# Patient Record
Sex: FEMALE | Race: WHITE | Employment: PART TIME | URBAN - METROPOLITAN AREA
[De-identification: names, ages, dates, MRNs, and addresses within clinical notes are randomized per-mention and may not be internally consistent; named-entity substitution may affect disease eponyms.]

---

## 2019-02-25 ENCOUNTER — HOSPITAL ENCOUNTER (OUTPATIENT)
Dept: MAMMOGRAPHY | Age: 49
Discharge: HOME | End: 2019-02-27
Payer: COMMERCIAL

## 2019-02-25 DIAGNOSIS — Z12.31 VISIT FOR SCREENING MAMMOGRAM: ICD-10-CM

## 2019-02-25 PROCEDURE — 77063 BREAST TOMOSYNTHESIS BI: CPT

## 2020-02-19 ENCOUNTER — OFFICE VISIT (OUTPATIENT)
Dept: PRIMARY CARE CLINIC | Age: 50
End: 2020-02-19
Payer: COMMERCIAL

## 2020-02-19 ENCOUNTER — HOSPITAL ENCOUNTER (OUTPATIENT)
Age: 50
Discharge: HOME | End: 2020-02-21
Payer: COMMERCIAL

## 2020-02-19 VITALS
OXYGEN SATURATION: 98 % | WEIGHT: 230 LBS | HEIGHT: 67 IN | DIASTOLIC BLOOD PRESSURE: 80 MMHG | HEART RATE: 81 BPM | SYSTOLIC BLOOD PRESSURE: 138 MMHG | BODY MASS INDEX: 36.1 KG/M2 | TEMPERATURE: 97.8 F

## 2020-02-19 PROBLEM — D68.59 THROMBOPHILIA (HCC): Status: ACTIVE | Noted: 2020-02-19

## 2020-02-19 PROBLEM — E78.2 MIXED HYPERLIPIDEMIA: Status: ACTIVE | Noted: 2020-02-19

## 2020-02-19 PROBLEM — G47.33 OSA (OBSTRUCTIVE SLEEP APNEA): Status: ACTIVE | Noted: 2020-02-19

## 2020-02-19 PROBLEM — Z00.00 HEALTH MAINTENANCE EXAMINATION: Status: ACTIVE | Noted: 2020-02-19

## 2020-02-19 PROBLEM — K76.9 LIVER DISEASE: Status: ACTIVE | Noted: 2020-02-19

## 2020-02-19 PROBLEM — E11.65 TYPE 2 DIABETES MELLITUS WITH HYPERGLYCEMIA, WITHOUT LONG-TERM CURRENT USE OF INSULIN (HCC): Status: ACTIVE | Noted: 2020-02-19

## 2020-02-19 LAB
ALBUMIN SERPL-MCNC: 4.4 G/DL (ref 3.5–5.2)
ALP BLD-CCNC: 74 U/L (ref 35–104)
ALT SERPL-CCNC: 26 U/L (ref 0–32)
ANION GAP SERPL CALCULATED.3IONS-SCNC: 16 MMOL/L (ref 7–16)
AST SERPL-CCNC: 19 U/L (ref 0–31)
BACTERIA: ABNORMAL /HPF
BASOPHILS ABSOLUTE: 0.05 E9/L (ref 0–0.2)
BASOPHILS RELATIVE PERCENT: 0.6 % (ref 0–2)
BILIRUB SERPL-MCNC: 0.3 MG/DL (ref 0–1.2)
BILIRUBIN URINE: NEGATIVE
BLOOD, URINE: NORMAL
BUN BLDV-MCNC: 13 MG/DL (ref 6–20)
CALCIUM SERPL-MCNC: 9.6 MG/DL (ref 8.6–10.2)
CHLORIDE BLD-SCNC: 98 MMOL/L (ref 98–107)
CHOLESTEROL, TOTAL: 156 MG/DL (ref 0–199)
CLARITY: CLEAR
CO2: 22 MMOL/L (ref 22–29)
COLOR: YELLOW
CREAT SERPL-MCNC: 0.7 MG/DL (ref 0.5–1)
CREATININE URINE: 95 MG/DL (ref 29–226)
EOSINOPHILS ABSOLUTE: 0.17 E9/L (ref 0.05–0.5)
EOSINOPHILS RELATIVE PERCENT: 2 % (ref 0–6)
GFR AFRICAN AMERICAN: >60
GFR NON-AFRICAN AMERICAN: >60 ML/MIN/1.73
GLUCOSE BLD-MCNC: 217 MG/DL (ref 74–99)
GLUCOSE URINE: NEGATIVE MG/DL
HBA1C MFR BLD: 10.2 % (ref 4–5.6)
HCT VFR BLD CALC: 42.8 % (ref 34–48)
HDLC SERPL-MCNC: 50 MG/DL
HEMOGLOBIN: 13.7 G/DL (ref 11.5–15.5)
IMMATURE GRANULOCYTES #: 0.03 E9/L
IMMATURE GRANULOCYTES %: 0.3 % (ref 0–5)
KETONES, URINE: NEGATIVE MG/DL
LDL CHOLESTEROL CALCULATED: 71 MG/DL (ref 0–99)
LEUKOCYTE ESTERASE, URINE: NEGATIVE
LYMPHOCYTES ABSOLUTE: 2.2 E9/L (ref 1.5–4)
LYMPHOCYTES RELATIVE PERCENT: 25.6 % (ref 20–42)
MCH RBC QN AUTO: 28.5 PG (ref 26–35)
MCHC RBC AUTO-ENTMCNC: 32 % (ref 32–34.5)
MCV RBC AUTO: 89 FL (ref 80–99.9)
MICROALBUMIN UR-MCNC: <12 MG/L
MICROALBUMIN/CREAT UR-RTO: ABNORMAL (ref 0–30)
MONOCYTES ABSOLUTE: 0.67 E9/L (ref 0.1–0.95)
MONOCYTES RELATIVE PERCENT: 7.8 % (ref 2–12)
NEUTROPHILS ABSOLUTE: 5.46 E9/L (ref 1.8–7.3)
NEUTROPHILS RELATIVE PERCENT: 63.7 % (ref 43–80)
NITRITE, URINE: NEGATIVE
PDW BLD-RTO: 13 FL (ref 11.5–15)
PH UA: 5 (ref 5–9)
PLATELET # BLD: 383 E9/L (ref 130–450)
PMV BLD AUTO: 9.9 FL (ref 7–12)
POTASSIUM SERPL-SCNC: 4.4 MMOL/L (ref 3.5–5)
PROTEIN UA: NEGATIVE MG/DL
RBC # BLD: 4.81 E12/L (ref 3.5–5.5)
RBC UA: ABNORMAL /HPF (ref 0–2)
SODIUM BLD-SCNC: 136 MMOL/L (ref 132–146)
SPECIFIC GRAVITY UA: >=1.03 (ref 1–1.03)
TOTAL PROTEIN: 7.8 G/DL (ref 6.4–8.3)
TRIGL SERPL-MCNC: 174 MG/DL (ref 0–149)
TSH SERPL DL<=0.05 MIU/L-ACNC: 2.21 UIU/ML (ref 0.27–4.2)
UROBILINOGEN, URINE: 0.2 E.U./DL
VLDLC SERPL CALC-MCNC: 35 MG/DL
WBC # BLD: 8.6 E9/L (ref 4.5–11.5)
WBC UA: ABNORMAL /HPF (ref 0–5)

## 2020-02-19 PROCEDURE — 80053 COMPREHEN METABOLIC PANEL: CPT

## 2020-02-19 PROCEDURE — 84443 ASSAY THYROID STIM HORMONE: CPT

## 2020-02-19 PROCEDURE — 82044 UR ALBUMIN SEMIQUANTITATIVE: CPT

## 2020-02-19 PROCEDURE — 81001 URINALYSIS AUTO W/SCOPE: CPT

## 2020-02-19 PROCEDURE — 99214 OFFICE O/P EST MOD 30 MIN: CPT | Performed by: FAMILY MEDICINE

## 2020-02-19 PROCEDURE — 85025 COMPLETE CBC W/AUTO DIFF WBC: CPT

## 2020-02-19 PROCEDURE — 80061 LIPID PANEL: CPT

## 2020-02-19 PROCEDURE — 83036 HEMOGLOBIN GLYCOSYLATED A1C: CPT

## 2020-02-19 PROCEDURE — 36415 COLL VENOUS BLD VENIPUNCTURE: CPT

## 2020-02-19 PROCEDURE — 82570 ASSAY OF URINE CREATININE: CPT

## 2020-02-19 RX ORDER — LISINOPRIL 40 MG/1
40 TABLET ORAL DAILY
COMMUNITY
Start: 2020-02-08 | End: 2020-02-19 | Stop reason: SDUPTHER

## 2020-02-19 RX ORDER — LISINOPRIL 40 MG/1
40 TABLET ORAL DAILY
Qty: 30 TABLET | Refills: 12 | Status: SHIPPED
Start: 2020-02-19 | End: 2021-03-15 | Stop reason: SDUPTHER

## 2020-02-19 RX ORDER — HYDROCHLOROTHIAZIDE 12.5 MG/1
12.5 TABLET ORAL DAILY
COMMUNITY
Start: 2020-02-08 | End: 2020-02-19 | Stop reason: SDUPTHER

## 2020-02-19 RX ORDER — AMLODIPINE BESYLATE 10 MG/1
10 TABLET ORAL DAILY
COMMUNITY
Start: 2020-01-24 | End: 2020-02-19 | Stop reason: SDUPTHER

## 2020-02-19 RX ORDER — SIMVASTATIN 20 MG
20 TABLET ORAL DAILY
Qty: 30 TABLET | Refills: 12 | Status: SHIPPED
Start: 2020-02-19 | End: 2021-03-22 | Stop reason: SDUPTHER

## 2020-02-19 RX ORDER — GLIPIZIDE 5 MG/1
5 TABLET, FILM COATED, EXTENDED RELEASE ORAL
COMMUNITY
Start: 2020-02-08 | End: 2020-02-19 | Stop reason: SDUPTHER

## 2020-02-19 RX ORDER — GLIPIZIDE 5 MG/1
TABLET, FILM COATED, EXTENDED RELEASE ORAL
Qty: 90 TABLET | Refills: 12 | Status: SHIPPED
Start: 2020-02-19 | End: 2021-03-15 | Stop reason: SDUPTHER

## 2020-02-19 RX ORDER — AMLODIPINE BESYLATE 10 MG/1
10 TABLET ORAL DAILY
Qty: 30 TABLET | Refills: 12 | Status: SHIPPED
Start: 2020-02-19 | End: 2021-03-22 | Stop reason: SDUPTHER

## 2020-02-19 RX ORDER — SIMVASTATIN 20 MG
20 TABLET ORAL DAILY
COMMUNITY
Start: 2020-01-24 | End: 2020-02-19 | Stop reason: SDUPTHER

## 2020-02-19 RX ORDER — HYDROCHLOROTHIAZIDE 12.5 MG/1
12.5 TABLET ORAL DAILY
Qty: 30 TABLET | Refills: 12 | Status: SHIPPED
Start: 2020-02-19 | End: 2021-03-15 | Stop reason: SDUPTHER

## 2020-02-19 RX ORDER — METOPROLOL SUCCINATE 50 MG/1
50 TABLET, EXTENDED RELEASE ORAL
COMMUNITY
Start: 2020-01-24 | End: 2020-02-19 | Stop reason: SDUPTHER

## 2020-02-19 RX ORDER — METOPROLOL SUCCINATE 50 MG/1
50 TABLET, EXTENDED RELEASE ORAL 2 TIMES DAILY
Qty: 60 TABLET | Refills: 12 | Status: SHIPPED
Start: 2020-02-19 | End: 2021-03-15 | Stop reason: SDUPTHER

## 2020-02-19 ASSESSMENT — PATIENT HEALTH QUESTIONNAIRE - PHQ9
2. FEELING DOWN, DEPRESSED OR HOPELESS: 0
1. LITTLE INTEREST OR PLEASURE IN DOING THINGS: 0
SUM OF ALL RESPONSES TO PHQ9 QUESTIONS 1 & 2: 0
SUM OF ALL RESPONSES TO PHQ QUESTIONS 1-9: 0
SUM OF ALL RESPONSES TO PHQ QUESTIONS 1-9: 0

## 2020-02-19 NOTE — ASSESSMENT & PLAN NOTE
Await bw. Tolerating therapy . declines seeing an endocrinologist and declines considering  injection/insulin. Lifestyle location emphasized. Hyper and hypoglycemic precautions reviewed. She's had some diabetic retinopathy needs to follow  closely with ophthamology. Watch ambulatory. If out of range, let us know. Stressed importance of daily foot examinations,  regular eye examinations etc. micro-and macrovascular complications reviewed . hyper and  hypoglycemic precautions reviewed at length. Janumet expensive .  consider jardiance, declines change now  Declines flu shot or pneumovax

## 2020-02-19 NOTE — ASSESSMENT & PLAN NOTE
Says excellent at home. Counseled. The risks of hypertension and hypotension reviewed. Watch closely ambulatory. Hyper and hypotensive precautions and parameters reviewed and written as well as parameters on pulse, call if out of range, ER dangers numbers. Lifestyle modification reviewed. Tolerating therapy.

## 2020-02-19 NOTE — PROGRESS NOTES
20  Deanna Manzo : 1970 Sex: female  Age: 45 y.o. Chief Complaint   Patient presents with    Medication Refill       HPI  HPI:    Patient has a follow-up. Last saw around . She feels well. Says her blood pressures been excellent at home. Says her sugars have overall been good. Has not had blood work in a while. She is compliant with her CPAP, working well she feels well with it. She uses it 7 days a week at least 6 hours per night        Declines flu shot or pneumovax. Review of Systems  ROS:  Const: Denies chills, fever, malaise and sweats. Eyes: Denies discharge, pain, redness and visual disturbance. ENMT: Denies earaches, other ear symptoms. Denies nasal or sinus symptoms other than stated  above. Denies mouth and tongue lesions and sore throat. CV: Denies chest discomfort, pain; diaphoresis, dizziness, edema, lightheadedness, orthopnea,  palpitations, syncope and near syncopal episode or any exertional symptoms  Resp: Denies cough, hemoptysis, pleuritic pain, SOB, sputum production and wheezing. GI: Denies abdominal pain, change in bowel habits, hematochezia, melena, nausea and vomiting. : Denies urinary symptoms including dysuria , urgency, frequency or hematuria. Musculo: Denies musculoskeletal symptoms. Skin: Denies bruising and rash.   Neuro: Denies headache, numbness, stiff neck, tingling and focal weakness slurred speech or facial  droop  Hema/Lymph: Denies bleeding/bruising tendency and enlarged lymph nodes        Current Outpatient Medications:     SITagliptin (JANUVIA) 100 MG tablet, Take 1 tablet by mouth daily, Disp: 30 tablet, Rfl: 12    simvastatin (ZOCOR) 20 MG tablet, Take 1 tablet by mouth daily, Disp: 30 tablet, Rfl: 12    metoprolol succinate (TOPROL XL) 50 MG extended release tablet, Take 1 tablet by mouth 2 times daily, Disp: 60 tablet, Rfl: 12    lisinopril (PRINIVIL;ZESTRIL) 40 MG tablet, Take 1 tablet by mouth daily, Disp: 30 tablet, Rfl: kg)        Physical Exam  Exam:  Const: Appears comfortable. No signs of acute distress present. Head/Face: Atraumatic on inspection. Eyes: EOMI in both eyes. No discharge from the eyes. PERRL. Sclerae clear. ENMT: Auditory canals normal. Tympanic membranes: intact and translucent. External nose WNL. Nasal mucosa is clear. Oropharynx: No erythema or exudate. Posterior pharynx is normal.  Neck: Supple. Palpation reveals no adenopathy. No masses appreciated. No JVD. Carotids: no  bruits. Resp: Respirations are unlabored. Clear to auscultation. No rales, rhonchi or wheezes appreciated  over the lungs bilaterally. CV: Rate is regular. Rhythm is regular. No gallop or rubs. No heart murmur appreciated. Extremities: No clubbing, cyanosis, or edema. No calf inflammation or tenderness. Abdomen: Bowel sounds are normoactive. Abdomen is soft, nontender, and nondistended. No  abdominal masses. No palpable hepatosplenomegaly. Lymph: No palpable or visible regional lymphadenopathy. Musculoskeletal: no acute joint inflammation. Skin: Dry and warm with no rash. Skin normal to inspection and palpation overall. Neuro: Alert and oriented. Affect: appropriate. Upper Extremities: 5/5 bilaterally. Lower Extremities:  5/5 bilaterally. Sensation intact to light touch. Reflexes: DTR's are symmetric and 2+ bilaterally. .  Cranial Nerves: Cranial nerves grossly intact. Assessment and Plan:   Diagnosis Orders   1. Type 2 diabetes mellitus with hyperglycemia, without long-term current use of insulin ([facility], [facility])  SITagliptin (JANUVIA) 100 MG tablet    glipiZIDE (GLUCOTROL XL) 5 MG extended release tablet    metFORMIN (GLUCOPHAGE) 1000 MG tablet    Comprehensive Metabolic Panel    Microalbumin / Creatinine Urine Ratio    TSH without Reflex    Urinalysis    Hemoglobin A1C   2. Thrombophilia (HCC)  CBC Auto Differential    TSH without Reflex   3.  Mixed hyperlipidemia  simvastatin (ZOCOR) 20 MG tablet    Comprehensive Metabolic Panel Lipid Panel    TSH without Reflex   4. CHINO (obstructive sleep apnea)  TSH without Reflex   5. Liver disease  Comprehensive Metabolic Panel    TSH without Reflex   6. Health maintenance examination  TSH without Reflex   7. Essential hypertension  metoprolol succinate (TOPROL XL) 50 MG extended release tablet    lisinopril (PRINIVIL;ZESTRIL) 40 MG tablet    hydrochlorothiazide (HYDRODIURIL) 12.5 MG tablet    amLODIPine (NORVASC) 10 MG tablet    TSH without Reflex       Essential hypertension  Says excellent at home. Counseled. The risks of hypertension and hypotension reviewed. Watch closely ambulatory. Hyper and hypotensive precautions and parameters reviewed and written as well as parameters on pulse, call if out of range, ER dangers numbers. Lifestyle modification reviewed. Tolerating therapy. Type 2 diabetes mellitus with hyperglycemia, without long-term current use of insulin (HCC)  Await bw. Tolerating therapy . declines seeing an endocrinologist and declines considering  injection/insulin. Lifestyle location emphasized. Hyper and hypoglycemic precautions reviewed. She's had some diabetic retinopathy needs to follow  closely with ophthamology. Watch ambulatory. If out of range, let us know. Stressed importance of daily foot examinations,  regular eye examinations etc. micro-and macrovascular complications reviewed . hyper and  hypoglycemic precautions reviewed at length. Janumet expensive . consider jardiance, declines change now  Declines flu shot or pneumovax    Thrombophilia ([facility] [address]  Counseled extensively. Differential reviewed, including serious etiologies. . Repeat. Mild, stable. Mixed hyperlipidemia  lifestyle modification reviewed. risk of hyperlipidemia reviewed tolerating therapy. Stable. Declines change in therapy. await repeat bw.    CHINO (obstructive sleep apnea)  Compliant. Feels very well with it, asymptomatic. Uses at least 6 to 8 hours per night 7 nights per week.     Liver disease  Counseled extensively. Differential reviewed, including serious etiologies. Likely fatty liver. Precautions reviewed. Lifestyle modification appropriate diet and weight loss reviewed. Risks of  even this leading to cirrhosis reviewed. Other than basic monitoring on interested in other evaluation  or treatment, in-depth blood work, imaging or otherwise. declines hep screening    Health maintenance examination  Counseled at length 2/19. Encourage yearly. Declines flu vaccine or Pneumovax. No flowsheet data found. Plan as above. Counseled extensively and differential diagnoses relevant to above were reviewed, including serious etiologies. Side effects and interactions of medications were reviewed. Compliance reviewed. Lifestyle modification. She will check insurance get blood work today and she can follow-up about a month to review and for full physical sooner as needed. Refills given. She really was hoping to follow-up yearly thereafter I explained the standards especially diabetes and ultimately she says she would agree to every 6 months but no more often unless issues. I Prefer every 3 to 4 months        As long as symptoms steadily improve/resolve, and medical conditions follow the expected course, FU as below, sooner PRN. Return in about 1 month (around 3/19/2020) for physical.         Signs and symptoms to watch for discussed, serious signs and symptoms reviewed. ER if any. Reyes Baker MD    Patients are advised to check with insurance company to ensure coverage and to fully understand benefits and cost prior to any testing. This note was created with the assistance of voice recognition software. Document was reviewed however may contain grammatical errors.

## 2020-02-19 NOTE — ASSESSMENT & PLAN NOTE
lifestyle modification reviewed. risk of hyperlipidemia reviewed tolerating therapy. Stable. Declines change in therapy. await repeat bw.

## 2020-02-24 RX ORDER — LANCETS 30 GAUGE
1 EACH MISCELLANEOUS DAILY
Qty: 100 EACH | Refills: 3 | Status: SHIPPED
Start: 2020-02-24 | End: 2020-03-04 | Stop reason: SDUPTHER

## 2020-02-24 RX ORDER — GLUCOSAMINE HCL/CHONDROITIN SU 500-400 MG
CAPSULE ORAL
Qty: 100 STRIP | Refills: 3 | Status: SHIPPED
Start: 2020-02-24 | End: 2020-02-26 | Stop reason: SDUPTHER

## 2020-02-24 RX ORDER — BLOOD-GLUCOSE METER
1 KIT MISCELLANEOUS DAILY
Qty: 1 KIT | Refills: 0 | Status: SHIPPED
Start: 2020-02-24 | End: 2020-02-26 | Stop reason: SDUPTHER

## 2020-02-26 RX ORDER — GLUCOSAMINE HCL/CHONDROITIN SU 500-400 MG
CAPSULE ORAL
Qty: 200 STRIP | Refills: 1 | Status: SHIPPED
Start: 2020-02-26 | End: 2020-03-04 | Stop reason: SDUPTHER

## 2020-02-26 RX ORDER — BLOOD-GLUCOSE METER
1 KIT MISCELLANEOUS DAILY
Qty: 1 KIT | Refills: 0 | Status: SHIPPED
Start: 2020-02-26 | End: 2020-03-04 | Stop reason: SDUPTHER

## 2020-02-26 NOTE — TELEPHONE ENCOUNTER
Pt calling asking for glucometer strips and new glucometer be sent to [facility]. Scripts were sent on 24th to [address] [place] [address] & [facility].   Scripts pended

## 2020-03-04 RX ORDER — LANCETS 30 GAUGE
1 EACH MISCELLANEOUS 2 TIMES DAILY
Qty: 200 EACH | Refills: 3 | Status: SHIPPED | OUTPATIENT
Start: 2020-03-04

## 2020-03-04 RX ORDER — GLUCOSAMINE HCL/CHONDROITIN SU 500-400 MG
CAPSULE ORAL
Qty: 200 STRIP | Refills: 1 | Status: SHIPPED | OUTPATIENT
Start: 2020-03-04

## 2020-03-04 RX ORDER — BLOOD-GLUCOSE METER
1 KIT MISCELLANEOUS DAILY
Qty: 1 KIT | Refills: 0 | Status: SHIPPED | OUTPATIENT
Start: 2020-03-04

## 2020-03-04 NOTE — TELEPHONE ENCOUNTER
Pt needs scripts for Glucometer and supplies resent to [facility]. Insurance will only cover One Touch. Med pended.   Noted in comments to only fill One Touch Verio

## 2020-03-20 PROBLEM — Z00.00 HEALTH MAINTENANCE EXAMINATION: Status: RESOLVED | Noted: 2020-02-19 | Resolved: 2020-03-20

## 2020-07-16 ENCOUNTER — TELEPHONE (OUTPATIENT)
Dept: PRIMARY CARE CLINIC | Age: 50
End: 2020-07-16

## 2020-07-28 NOTE — TELEPHONE ENCOUNTER
Pharmacy called into office stating there is no generic for Januvia. Pharmacy stated the script is still showing it needs a prior authorization. Please call [phone].

## 2020-08-04 NOTE — TELEPHONE ENCOUNTER
Spoke with russ at [facility]   Medication is approved for one year 08/04/2020-08/04/21  Will fax approval

## 2020-08-10 NOTE — TELEPHONE ENCOUNTER
Pharmacy calling this med is still running as denied. On the letter from the insurance it states the auth was only good for one day. It was ran on 8/4 and was also not going through then.

## 2021-03-15 DIAGNOSIS — I10 ESSENTIAL HYPERTENSION: ICD-10-CM

## 2021-03-15 DIAGNOSIS — E11.65 TYPE 2 DIABETES MELLITUS WITH HYPERGLYCEMIA, WITHOUT LONG-TERM CURRENT USE OF INSULIN (HCC): ICD-10-CM

## 2021-03-15 RX ORDER — LISINOPRIL 40 MG/1
40 TABLET ORAL DAILY
Qty: 30 TABLET | Refills: 0 | Status: SHIPPED
Start: 2021-03-15 | End: 2021-04-02 | Stop reason: SDUPTHER

## 2021-03-15 RX ORDER — HYDROCHLOROTHIAZIDE 12.5 MG/1
12.5 TABLET ORAL DAILY
Qty: 30 TABLET | Refills: 0 | Status: SHIPPED
Start: 2021-03-15 | End: 2021-04-02 | Stop reason: SDUPTHER

## 2021-03-15 RX ORDER — METOPROLOL SUCCINATE 50 MG/1
50 TABLET, EXTENDED RELEASE ORAL 2 TIMES DAILY
Qty: 60 TABLET | Refills: 0 | Status: SHIPPED
Start: 2021-03-15 | End: 2021-04-02 | Stop reason: SDUPTHER

## 2021-03-15 RX ORDER — GLIPIZIDE 5 MG/1
TABLET, FILM COATED, EXTENDED RELEASE ORAL
Qty: 90 TABLET | Refills: 0 | Status: SHIPPED
Start: 2021-03-15 | End: 2021-04-02 | Stop reason: SDUPTHER

## 2021-03-22 DIAGNOSIS — I10 ESSENTIAL HYPERTENSION: ICD-10-CM

## 2021-03-22 DIAGNOSIS — E78.2 MIXED HYPERLIPIDEMIA: ICD-10-CM

## 2021-03-22 DIAGNOSIS — E11.65 TYPE 2 DIABETES MELLITUS WITH HYPERGLYCEMIA, WITHOUT LONG-TERM CURRENT USE OF INSULIN (HCC): ICD-10-CM

## 2021-03-22 RX ORDER — SIMVASTATIN 20 MG
20 TABLET ORAL DAILY
Qty: 30 TABLET | Refills: 0 | Status: SHIPPED
Start: 2021-03-22 | End: 2021-04-02 | Stop reason: SDUPTHER

## 2021-03-22 RX ORDER — AMLODIPINE BESYLATE 10 MG/1
10 TABLET ORAL DAILY
Qty: 30 TABLET | Refills: 0 | Status: SHIPPED
Start: 2021-03-22 | End: 2021-04-02 | Stop reason: SDUPTHER

## 2021-04-02 ENCOUNTER — OFFICE VISIT (OUTPATIENT)
Dept: PRIMARY CARE CLINIC | Age: 51
End: 2021-04-02
Payer: COMMERCIAL

## 2021-04-02 VITALS
HEART RATE: 83 BPM | OXYGEN SATURATION: 97 % | SYSTOLIC BLOOD PRESSURE: 138 MMHG | TEMPERATURE: 97.3 F | WEIGHT: 206 LBS | DIASTOLIC BLOOD PRESSURE: 88 MMHG | BODY MASS INDEX: 32.26 KG/M2

## 2021-04-02 DIAGNOSIS — G47.33 OSA (OBSTRUCTIVE SLEEP APNEA): ICD-10-CM

## 2021-04-02 DIAGNOSIS — D68.59 THROMBOPHILIA (HCC): ICD-10-CM

## 2021-04-02 DIAGNOSIS — Z12.31 ENCOUNTER FOR SCREENING MAMMOGRAM FOR MALIGNANT NEOPLASM OF BREAST: ICD-10-CM

## 2021-04-02 DIAGNOSIS — Z12.11 COLON CANCER SCREENING: ICD-10-CM

## 2021-04-02 DIAGNOSIS — E78.2 MIXED HYPERLIPIDEMIA: ICD-10-CM

## 2021-04-02 DIAGNOSIS — I10 ESSENTIAL HYPERTENSION: ICD-10-CM

## 2021-04-02 DIAGNOSIS — E11.65 TYPE 2 DIABETES MELLITUS WITH HYPERGLYCEMIA, WITHOUT LONG-TERM CURRENT USE OF INSULIN (HCC): Primary | ICD-10-CM

## 2021-04-02 DIAGNOSIS — K76.9 LIVER DISEASE: ICD-10-CM

## 2021-04-02 PROCEDURE — 99214 OFFICE O/P EST MOD 30 MIN: CPT | Performed by: FAMILY MEDICINE

## 2021-04-02 RX ORDER — LISINOPRIL 40 MG/1
40 TABLET ORAL DAILY
Qty: 30 TABLET | Refills: 3 | Status: SHIPPED
Start: 2021-04-02 | End: 2021-07-06 | Stop reason: SDUPTHER

## 2021-04-02 RX ORDER — AMLODIPINE BESYLATE 10 MG/1
10 TABLET ORAL DAILY
Qty: 30 TABLET | Refills: 3 | Status: SHIPPED
Start: 2021-04-02 | End: 2021-07-06 | Stop reason: SDUPTHER

## 2021-04-02 RX ORDER — HYDROCHLOROTHIAZIDE 12.5 MG/1
12.5 TABLET ORAL DAILY
Qty: 30 TABLET | Refills: 3 | Status: SHIPPED
Start: 2021-04-02 | End: 2021-07-06 | Stop reason: SDUPTHER

## 2021-04-02 RX ORDER — METOPROLOL SUCCINATE 50 MG/1
50 TABLET, EXTENDED RELEASE ORAL 2 TIMES DAILY
Qty: 60 TABLET | Refills: 3 | Status: SHIPPED
Start: 2021-04-02 | End: 2021-07-06 | Stop reason: SDUPTHER

## 2021-04-02 RX ORDER — SIMVASTATIN 20 MG
20 TABLET ORAL DAILY
Qty: 30 TABLET | Refills: 3 | Status: SHIPPED
Start: 2021-04-02 | End: 2021-07-06 | Stop reason: SDUPTHER

## 2021-04-02 RX ORDER — GLIPIZIDE 5 MG/1
TABLET, FILM COATED, EXTENDED RELEASE ORAL
Qty: 90 TABLET | Refills: 3 | Status: SHIPPED
Start: 2021-04-02 | End: 2022-02-22 | Stop reason: ALTCHOICE

## 2021-04-02 SDOH — ECONOMIC STABILITY: FOOD INSECURITY: WITHIN THE PAST 12 MONTHS, YOU WORRIED THAT YOUR FOOD WOULD RUN OUT BEFORE YOU GOT MONEY TO BUY MORE.: PATIENT DECLINED

## 2021-04-02 SDOH — ECONOMIC STABILITY: INCOME INSECURITY: HOW HARD IS IT FOR YOU TO PAY FOR THE VERY BASICS LIKE FOOD, HOUSING, MEDICAL CARE, AND HEATING?: PATIENT DECLINED

## 2021-04-02 SDOH — ECONOMIC STABILITY: FOOD INSECURITY: WITHIN THE PAST 12 MONTHS, THE FOOD YOU BOUGHT JUST DIDN'T LAST AND YOU DIDN'T HAVE MONEY TO GET MORE.: PATIENT DECLINED

## 2021-04-02 ASSESSMENT — PATIENT HEALTH QUESTIONNAIRE - PHQ9
SUM OF ALL RESPONSES TO PHQ QUESTIONS 1-9: 0
1. LITTLE INTEREST OR PLEASURE IN DOING THINGS: 0
2. FEELING DOWN, DEPRESSED OR HOPELESS: 0

## 2021-04-02 NOTE — PROGRESS NOTES
Disp: 30 tablet, Rfl: 3    lisinopril (PRINIVIL;ZESTRIL) 40 MG tablet, Take 1 tablet by mouth daily, Disp: 30 tablet, Rfl: 3    metFORMIN (GLUCOPHAGE) 1000 MG tablet, Take 1 tablet by mouth 2 times daily (with meals), Disp: 60 tablet, Rfl: 3    metoprolol succinate (TOPROL XL) 50 MG extended release tablet, Take 1 tablet by mouth 2 times daily, Disp: 60 tablet, Rfl: 3    simvastatin (ZOCOR) 20 MG tablet, Take 1 tablet by mouth daily, Disp: 30 tablet, Rfl: 3    SITagliptin (JANUVIA) 100 MG tablet, Take 1 tablet by mouth daily, Disp: 30 tablet, Rfl: 3    blood glucose monitor strips, Test 1-2times qd, Disp: 200 strip, Rfl: 1    glucose monitoring kit (FREESTYLE) monitoring kit, 1 kit by Does not apply route daily, Disp: 1 kit, Rfl: 0    Lancets MISC, 1 each by Does not apply route 2 times daily, Disp: 200 each, Rfl: 3  Allergies   Allergen Reactions    Potassium-Containing Compounds        No past medical history on file. No past surgical history on file. No family history on file. Social History     Tobacco Use    Smoking status: Former Smoker     Packs/day: 1.50     Years: 4.00     Pack years: 6.00     Types: Cigarettes    Smokeless tobacco: Never Used   Substance Use Topics    Alcohol use: Not on file    Drug use: Not on file      Social History     Social History Narrative    PMH:    Problem List: Essential hypertension    Health Maintenance:    Mammogram - (2019)    Mammogram Screening - (2019)    Medical Problems:    Hypertension    Tavo - CPAP    gestational DM, Type 2 Diabetes    Surgical Hx:     Section    Reviewed, no changes. FH:        Father:    . (Hx)    Mother:    . (Hx)Mom - DM, HTN    Dad - doesn't know    Reviewed, no changes. SH:    . 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 Alcohol: Denies alcohol    use. reviewed. risk of hyperlipidemia reviewed tolerating therapy. Stable. Declines change in therapy. await repeat bw. Newer agent statins and various potencies of statins reviewed ordered fit test and mammogram    CHINO (obstructive sleep apnea)  Compliant. Feels very well with it, asymptomatic. Uses at least 6 to 8 hours per night 7 nights per week. Liver disease  Counseled extensively. Differential reviewed, including serious etiologies. Likely fatty liver. Precautions reviewed. Lifestyle modification appropriate diet and weight loss reviewed. Risks of  even this leading to cirrhosis reviewed. Other than basic monitoring on interested in other evaluation  or treatment, in-depth blood work, imaging or otherwise. declines hep screening    Health maintenance examination  Counseled at length 2/19. Encourage yearly. Declines flu vaccine or Pneumovax. She will call for blood work         No flowsheet data found. Plan as above. Counseled extensively and differential diagnoses relevant to above were reviewed, including serious etiologies. Side effects and interactions of medications were reviewed. And agrees to follow-up, would prefer virtually, if abnormal, as needed otherwise defers follow-up for 3 months and plan physical then, sooner as needed. Precautions reviewed. As long as symptoms steadily improve/resolve, and medical conditions follow the expected course, FU as below, sooner PRN. Return in about 3 months (around 7/2/2021), or if symptoms worsen or fail to improve, for physical.         Signs and symptoms to watch for discussed, serious signs and symptoms reviewed. ER if any. Pepe Mixon MD    Patients are advised to check with insurance company to ensure coverage and to fully understand benefits and cost prior to any testing. This note was created with the assistance of voice recognition software. Document was reviewed however may contain grammatical errors.

## 2021-05-18 ENCOUNTER — HOSPITAL ENCOUNTER (OUTPATIENT)
Dept: MAMMOGRAPHY | Age: 51
Discharge: HOME | End: 2021-05-20
Payer: COMMERCIAL

## 2021-05-18 DIAGNOSIS — Z12.31 ENCOUNTER FOR SCREENING MAMMOGRAM FOR MALIGNANT NEOPLASM OF BREAST: ICD-10-CM

## 2021-05-18 PROCEDURE — 77067 SCR MAMMO BI INCL CAD: CPT

## 2021-07-02 LAB
ALBUMIN SERPL-MCNC: 4.4 G/DL
ALP BLD-CCNC: 64 U/L
ALT SERPL-CCNC: 21 U/L
ANION GAP SERPL CALCULATED.3IONS-SCNC: 1.6 MMOL/L
ANTIBODY: NORMAL
AST SERPL-CCNC: 18 U/L
AVERAGE GLUCOSE: NORMAL
BASOPHILS ABSOLUTE: 59 /ΜL
BASOPHILS RELATIVE PERCENT: 0.7 %
BILIRUB SERPL-MCNC: 0.5 MG/DL (ref 0.1–1.4)
BILIRUBIN, URINE: NEGATIVE
BLOOD, URINE: NEGATIVE
BUN BLDV-MCNC: 23 MG/DL
CALCIUM SERPL-MCNC: 9.6 MG/DL
CHLORIDE BLD-SCNC: 99 MMOL/L
CHOLESTEROL, TOTAL: 198 MG/DL
CHOLESTEROL/HDL RATIO: 2.6
CLARITY: CLEAR
CO2: 28 MMOL/L
COLOR: YELLOW
CREAT SERPL-MCNC: 0.92 MG/DL
CREATININE, URINE: 205
EOSINOPHILS ABSOLUTE: 118 /ΜL
EOSINOPHILS RELATIVE PERCENT: 1.4 %
GFR CALCULATED: 72
GLUCOSE BLD-MCNC: 128 MG/DL
GLUCOSE URINE: NORMAL
HBA1C MFR BLD: 7.5 %
HCT VFR BLD CALC: 37 % (ref 36–46)
HDLC SERPL-MCNC: 76 MG/DL (ref 35–70)
HEMOGLOBIN: 12.4 G/DL (ref 12–16)
KETONES, URINE: NEGATIVE
LDL CHOLESTEROL CALCULATED: 96 MG/DL (ref 0–160)
LEUKOCYTE ESTERASE, URINE: NEGATIVE
LYMPHOCYTES ABSOLUTE: 2562 /ΜL
LYMPHOCYTES RELATIVE PERCENT: 30.5 %
MCH RBC QN AUTO: 30.1 PG
MCHC RBC AUTO-ENTMCNC: 33.5 G/DL
MCV RBC AUTO: 89.8 FL
MICROALBUMIN/CREAT 24H UR: 1.2 MG/G{CREAT}
MICROALBUMIN/CREAT UR-RTO: 6
MONOCYTES ABSOLUTE: 739 /ΜL
MONOCYTES RELATIVE PERCENT: 8.8 %
NEUTROPHILS ABSOLUTE: 4922 /ΜL
NEUTROPHILS RELATIVE PERCENT: 58.6 %
NITRITE, URINE: NEGATIVE
NONHDLC SERPL-MCNC: 122 MG/DL
PDW BLD-RTO: NORMAL %
PH UA: 5.5 (ref 4.5–8)
PLATELET # BLD: 406 K/ΜL
PMV BLD AUTO: 9.8 FL
POTASSIUM SERPL-SCNC: 3.8 MMOL/L
PROTEIN UA: NEGATIVE
RBC # BLD: 4.12 10^6/ΜL
SODIUM BLD-SCNC: 135 MMOL/L
SPECIFIC GRAVITY, URINE: NORMAL
TOTAL PROTEIN: 7.2
TRIGL SERPL-MCNC: 154 MG/DL
UROBILINOGEN, URINE: NORMAL
VLDLC SERPL CALC-MCNC: ABNORMAL MG/DL
WBC # BLD: 8.4 10^3/ML

## 2021-07-06 ENCOUNTER — OFFICE VISIT (OUTPATIENT)
Dept: PRIMARY CARE CLINIC | Age: 51
End: 2021-07-06
Payer: COMMERCIAL

## 2021-07-06 VITALS
DIASTOLIC BLOOD PRESSURE: 86 MMHG | HEART RATE: 74 BPM | OXYGEN SATURATION: 99 % | WEIGHT: 206 LBS | BODY MASS INDEX: 32.33 KG/M2 | HEIGHT: 67 IN | RESPIRATION RATE: 18 BRPM | TEMPERATURE: 97.2 F | SYSTOLIC BLOOD PRESSURE: 138 MMHG

## 2021-07-06 DIAGNOSIS — E78.2 MIXED HYPERLIPIDEMIA: ICD-10-CM

## 2021-07-06 DIAGNOSIS — I10 ESSENTIAL HYPERTENSION: ICD-10-CM

## 2021-07-06 DIAGNOSIS — G47.33 OSA (OBSTRUCTIVE SLEEP APNEA): ICD-10-CM

## 2021-07-06 DIAGNOSIS — K76.9 LIVER DISEASE: ICD-10-CM

## 2021-07-06 DIAGNOSIS — Z00.00 HEALTH MAINTENANCE EXAMINATION: Primary | ICD-10-CM

## 2021-07-06 DIAGNOSIS — D68.59 THROMBOPHILIA (HCC): ICD-10-CM

## 2021-07-06 DIAGNOSIS — E11.65 TYPE 2 DIABETES MELLITUS WITH HYPERGLYCEMIA, WITHOUT LONG-TERM CURRENT USE OF INSULIN (HCC): ICD-10-CM

## 2021-07-06 PROCEDURE — 99396 PREV VISIT EST AGE 40-64: CPT | Performed by: FAMILY MEDICINE

## 2021-07-06 PROCEDURE — 90715 TDAP VACCINE 7 YRS/> IM: CPT | Performed by: FAMILY MEDICINE

## 2021-07-06 PROCEDURE — 90471 IMMUNIZATION ADMIN: CPT | Performed by: FAMILY MEDICINE

## 2021-07-06 RX ORDER — HYDROCHLOROTHIAZIDE 12.5 MG/1
12.5 TABLET ORAL DAILY
Qty: 30 TABLET | Refills: 6 | Status: SHIPPED
Start: 2021-07-06 | End: 2022-02-22 | Stop reason: SDUPTHER

## 2021-07-06 RX ORDER — AMLODIPINE BESYLATE 10 MG/1
10 TABLET ORAL DAILY
Qty: 30 TABLET | Refills: 6 | Status: SHIPPED
Start: 2021-07-06 | End: 2022-02-22 | Stop reason: SDUPTHER

## 2021-07-06 RX ORDER — SIMVASTATIN 20 MG
20 TABLET ORAL DAILY
Qty: 30 TABLET | Refills: 6 | Status: SHIPPED
Start: 2021-07-06 | End: 2022-02-22 | Stop reason: SDUPTHER

## 2021-07-06 RX ORDER — METOPROLOL SUCCINATE 50 MG/1
50 TABLET, EXTENDED RELEASE ORAL 2 TIMES DAILY
Qty: 60 TABLET | Refills: 6 | Status: SHIPPED
Start: 2021-07-06 | End: 2022-02-22 | Stop reason: SDUPTHER

## 2021-07-06 RX ORDER — LISINOPRIL 40 MG/1
40 TABLET ORAL DAILY
Qty: 30 TABLET | Refills: 6 | Status: SHIPPED
Start: 2021-07-06 | End: 2022-02-22 | Stop reason: SDUPTHER

## 2021-07-06 NOTE — PROGRESS NOTES
20  Maddy Christopher : 1970 Sex: female  Age: 45 y.o. Chief Complaint   Patient presents with    Annual Exam       HPI  HPI:    Patient presents today for follow-up, physical and refills. Overall feeling very well    MICRO neg, , LDL 71-96 (sausage), HDL 50-76, , a1c 10.2-7.5, tsh 2.95, cbc [id number] (counseled, simply wants rechecked 6mos) ua neg, hep C neg      Health Maintenance:  Proper diet reviewed including Mediterranean and DASH diets. Counseled on healthy weight, appropriate exercise, avoidance of tobacco, and recommendations for minimal to no alcohol consumption. Counseled on the potential pros and cons of vitamins and supplements. Reviewed the recommendations and risk/benefits of vaccines including, covid vaccine x 2,  Td/Tdap (10/10, agrees),  Pneumovax (Declines) ,  prevnar 13 (Declines)  , flu vaccine, Hepatitis vaccines, gardasil, and shingrix (patient had chicken pox), encouraged shingrix vaccine. HIV and Hep C (neg)screening guidelines were reviewed. Importance of regular eye and dental exams and health reviewed. Risks/Benefits of ASA reviewed and discussed latest guidelines. Sun protection reviewed. Notify if any new or changing moles/skin lesions, etc. Dexa Scan indications/ risk factors for osteoporotic fractures and prevention reviewed. Colonoscopy recommendations reviewed. Pt denies change in bowel habits or [address] of colon polyps/CA. Unfortunately declines. Fit test declines    Indications for EKG and  for additional  cardiac testing including referrals, stress testing,  2d echo, ect. dasx  Reviewed indications for other testing such as  PFT's.and  indications for imaging including brain, carotid, chest, abdominal, aortic , dasx    Counseled on instructions regarding, and importance of monthly breast exams, yearly physician exams (sooner if sx's). Indications for mammograms reviewed and importance.     Dexa Scan indications/ risk factors for osteoporotic fractures (and associated M/M) and preventative measures reviewed. Importance of regular GYN exams reviewed and pt to follow here or with her gynecologist as directed. Behind but will see dr Seth Martinez, [facility] mammo. Review of Systems  ROS:  Const: Denies chills, fever, malaise and sweats. Eyes: Denies discharge, pain, redness and visual disturbance. ENMT: Denies earaches, other ear symptoms. Denies nasal or sinus symptoms other than stated  above. Denies mouth and tongue lesions and sore throat. CV: Denies chest discomfort, pain; diaphoresis, dizziness, edema, lightheadedness, orthopnea,  palpitations, syncope and near syncopal episode or any exertional symptoms  Resp: Denies cough, hemoptysis, pleuritic pain, SOB, sputum production and wheezing. GI: Denies abdominal pain, change in bowel habits, hematochezia, melena, nausea and vomiting. : Denies urinary symptoms including dysuria , urgency, frequency or hematuria. Musculo: Denies musculoskeletal symptoms. Skin: Denies bruising and rash.   Neuro: Denies headache, numbness, stiff neck, tingling and focal weakness slurred speech or facial  droop  Hema/Lymph: Denies bleeding/bruising tendency and enlarged lymph nodes        Current Outpatient Medications:     amLODIPine (NORVASC) 10 MG tablet, Take 1 tablet by mouth daily, Disp: 30 tablet, Rfl: 6    hydroCHLOROthiazide (HYDRODIURIL) 12.5 MG tablet, Take 1 tablet by mouth daily, Disp: 30 tablet, Rfl: 6    lisinopril (PRINIVIL;ZESTRIL) 40 MG tablet, Take 1 tablet by mouth daily, Disp: 30 tablet, Rfl: 6    metFORMIN (GLUCOPHAGE) 1000 MG tablet, Take 1 tablet by mouth 2 times daily (with meals), Disp: 60 tablet, Rfl: 6    metoprolol succinate (TOPROL XL) 50 MG extended release tablet, Take 1 tablet by mouth 2 times daily, Disp: 60 tablet, Rfl: 6    simvastatin (ZOCOR) 20 MG tablet, Take 1 tablet by mouth daily, Disp: 30 tablet, Rfl: 6    SITagliptin (JANUVIA) 100 MG tablet, Take 1 tablet by mouth daily, Disp: 30 tablet, Rfl: 6    glipiZIDE (GLUCOTROL XL) 5 MG extended release tablet, 2 tablets by mouth every morning and 1 tablet by mouth before dinner, Disp: 90 tablet, Rfl: 3    glucose monitoring kit (FREESTYLE) monitoring kit, 1 kit by Does not apply route daily, Disp: 1 kit, Rfl: 0    Lancets MISC, 1 each by Does not apply route 2 times daily, Disp: 200 each, Rfl: 3    blood glucose monitor strips, Test 1-2times qd, Disp: 200 strip, Rfl: 1  Allergies   Allergen Reactions    Potassium-Containing Compounds        No past medical history on file. No past surgical history on file. Family History   Problem Relation Age of Onset    Uterine Cancer Maternal Aunt      Social History     Tobacco Use    Smoking status: Former Smoker     Packs/day: 1.50     Years: 4.00     Pack years: 6.00     Types: Cigarettes    Smokeless tobacco: Never Used   Substance Use Topics    Alcohol use: Not on file    Drug use: Not on file      Social History     Social History Narrative    PMH:    Problem List: Essential hypertension    Health Maintenance:    Mammogram - (2019)    Mammogram Screening - (2019)    Medical Problems:    Hypertension    Tavo - CPAP    gestational DM, Type 2 Diabetes    Surgical Hx:     Section    Reviewed, no changes. FH:        Father:    . (Hx)    Mother:    . (Hx)Mom - DM, HTN    Dad - doesn't know    Reviewed, no changes. SH:    . 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 Alcohol: Denies alcohol    use. Reviewed, no changes.         Vitals:    21 1358   BP: 138/86   Pulse: 74   Resp: 18   Temp: 97.2 °F (36.2 °C)   TempSrc: Temporal   SpO2: 99%   Weight: 206 lb (93.4 kg)   Height: 5' 7\" (1.702 m)      Wt Readings from Last 3 Encounters:   21 206 lb (93.4 kg)   21 206 lb (93.4 kg)   20 230 lb (104.3 kg) Exam:  Const: Appears comfortable. No signs of acute distress present. Head/Face: Atraumatic on inspection. Eyes: EOMI in both eyes. No discharge from the eyes. PERRL. Sclerae clear. ENMT: Auditory canals normal. Tympanic membranes: intact and translucent. External nose WNL. Nasal mucosa is clear. Oropharynx: No erythema or exudate. Posterior pharynx is normal.  Neck: Supple. Palpation reveals no adenopathy. No masses appreciated. No JVD. Carotids: no  bruits. Resp: Respirations are unlabored. Clear to auscultation. No rales, rhonchi or wheezes appreciated  over the lungs bilaterally. CV: Rate is regular. Rhythm is regular. No gallop or rubs. No heart murmur appreciated. Extremities: No clubbing, cyanosis, or edema. No calf inflammation or tenderness. Abdomen: Bowel sounds are normoactive. Abdomen is soft, nontender, and nondistended. No  abdominal masses. No palpable hepatosplenomegaly. Lymph: No palpable or visible regional lymphadenopathy. Musculoskeletal: no acute joint inflammation. Skin: Dry and warm with no rash. Skin normal to inspection and palpation overall. Neuro: Alert and oriented. Affect: appropriate. Upper Extremities: 5/5 bilaterally. Lower Extremities:  5/5 bilaterally. Sensation intact to light touch. Reflexes: DTR's are symmetric and 2+ bilaterally. .  Cranial Nerves: Cranial nerves grossly intact. Assessment and Plan:   Diagnosis Orders   1. Health maintenance examination  Tdap (age 10y-63y) IM (Adacel)   2. Essential hypertension  amLODIPine (NORVASC) 10 MG tablet    hydroCHLOROthiazide (HYDRODIURIL) 12.5 MG tablet    lisinopril (PRINIVIL;ZESTRIL) 40 MG tablet    metoprolol succinate (TOPROL XL) 50 MG extended release tablet    TSH without Reflex    Comprehensive Metabolic Panel    CBC Auto Differential   3.  Type 2 diabetes mellitus with hyperglycemia, without long-term current use of insulin (HCC)  metFORMIN (GLUCOPHAGE) 1000 MG tablet    SITagliptin (JANUVIA) 100 MG tablet    TSH without Reflex    Comprehensive Metabolic Panel    CBC Auto Differential    Hemoglobin A1C    Urinalysis    Microalbumin / Creatinine Urine Ratio   4. Mixed hyperlipidemia  simvastatin (ZOCOR) 20 MG tablet    Lipid Panel    TSH without Reflex    Comprehensive Metabolic Panel    CBC Auto Differential    CK   5. Thrombophilia ([facility] [address]     6. CHINO (obstructive sleep apnea)     7. Liver disease         Essential hypertension  Says excellent at home. Counseled. The risks of hypertension and hypotension reviewed. Watch closely ambulatory. Hyper and hypotensive precautions and parameters reviewed and written as well as parameters on pulse, call if out of range, ER dangers numbers. Lifestyle modification reviewed. Tolerating therapy. Type 2 diabetes mellitus with hyperglycemia, without long-term current use of insulin ([facility])  Hemoglobin A1c significantly improved, goals reviewed. Tolerating therapy . Not interested in seeing endocrinologist.  Lifestyle location emphasized. Hyper and hypoglycemic precautions reviewed. She's had some diabetic retinopathy needs to follow  closely with ophthamology. Watch ambulatory. If out of range, let us know. Stressed importance of daily foot examinations,  regular eye examinations etc. micro-and macrovascular complications reviewed . hyper and  hypoglycemic precautions reviewed at length. Janumet expensive . consider jardiance, declines change now  Declines flu shot or pneumovax    Thrombophilia ([facility] [address]  Counseled extensively. Differential reviewed, including serious etiologies. . Repeat. Mild, stable. Monitor    Mixed hyperlipidemia  lifestyle modification reviewed. risk of hyperlipidemia reviewed tolerating therapy. Stable. Declines change in therapy. Plan wants rechecked in 6 months. Newer agent statins and various potencies of statins reviewed ordered fit test and mammogram    CHINO (obstructive sleep apnea)  Compliant. Feels very well with it, asymptomatic.   Uses at least 6 to 8 hours per night 7 nights per week. Liver disease  Counseled extensively. Differential reviewed, including serious etiologies. Likely fatty liver. Precautions reviewed. Lifestyle modification appropriate diet and weight loss reviewed. Risks of  even this   reviewed. Hepatitis C screen negative. Simply wants repeat LFTs in 6 months, defers otherwise. Health maintenance examination  Counseled at length as above, 7/21. Encourage yearly. No flowsheet data found. Plan as above. Counseled extensively and differential diagnoses relevant to above were reviewed, including serious etiologies. Side effects and interactions of medications were reviewed. Counseled, health maintenance issues addressed as above, refills provided. Lifestyle modification. Otherwise simply wants blood work and follow-up 6 months sooner as needed. Ideal body weight reviewed. As long as symptoms steadily improve/resolve, and medical conditions follow the expected course, FU as below, sooner PRN. Return in about 6 months (around 1/6/2022), or if symptoms worsen or fail to improve, for Review BW. Signs and symptoms to watch for discussed, serious signs and symptoms reviewed. ER if any. Kristi Ledesma MD    Patients are advised to check with insurance company to ensure coverage and to fully understand benefits and cost prior to any testing. This note was created with the assistance of voice recognition software. Document was reviewed however may contain grammatical errors.

## 2021-07-08 DIAGNOSIS — Z12.31 ENCOUNTER FOR SCREENING MAMMOGRAM FOR MALIGNANT NEOPLASM OF BREAST: ICD-10-CM

## 2021-07-08 DIAGNOSIS — I10 ESSENTIAL HYPERTENSION: ICD-10-CM

## 2021-07-08 DIAGNOSIS — E78.2 MIXED HYPERLIPIDEMIA: ICD-10-CM

## 2021-07-08 DIAGNOSIS — Z12.11 COLON CANCER SCREENING: ICD-10-CM

## 2021-07-08 DIAGNOSIS — E11.65 TYPE 2 DIABETES MELLITUS WITH HYPERGLYCEMIA, WITHOUT LONG-TERM CURRENT USE OF INSULIN (HCC): ICD-10-CM

## 2021-07-08 LAB — TSH SERPL DL<=0.05 MIU/L-ACNC: 2.95 UIU/ML

## 2022-02-22 ENCOUNTER — OFFICE VISIT (OUTPATIENT)
Dept: PRIMARY CARE CLINIC | Age: 52
End: 2022-02-22
Payer: COMMERCIAL

## 2022-02-22 VITALS
SYSTOLIC BLOOD PRESSURE: 132 MMHG | WEIGHT: 216.6 LBS | HEART RATE: 90 BPM | TEMPERATURE: 97.4 F | DIASTOLIC BLOOD PRESSURE: 72 MMHG | OXYGEN SATURATION: 98 % | BODY MASS INDEX: 33.92 KG/M2

## 2022-02-22 DIAGNOSIS — I10 ESSENTIAL HYPERTENSION: Primary | ICD-10-CM

## 2022-02-22 DIAGNOSIS — K76.9 LIVER DISEASE: ICD-10-CM

## 2022-02-22 DIAGNOSIS — Z00.00 HEALTH MAINTENANCE EXAMINATION: ICD-10-CM

## 2022-02-22 DIAGNOSIS — E11.65 TYPE 2 DIABETES MELLITUS WITH HYPERGLYCEMIA, WITHOUT LONG-TERM CURRENT USE OF INSULIN (HCC): ICD-10-CM

## 2022-02-22 DIAGNOSIS — E78.2 MIXED HYPERLIPIDEMIA: ICD-10-CM

## 2022-02-22 DIAGNOSIS — G47.33 OSA (OBSTRUCTIVE SLEEP APNEA): ICD-10-CM

## 2022-02-22 DIAGNOSIS — D68.59 THROMBOPHILIA (HCC): ICD-10-CM

## 2022-02-22 DIAGNOSIS — Z12.31 ENCOUNTER FOR SCREENING MAMMOGRAM FOR MALIGNANT NEOPLASM OF BREAST: ICD-10-CM

## 2022-02-22 PROCEDURE — 99214 OFFICE O/P EST MOD 30 MIN: CPT | Performed by: FAMILY MEDICINE

## 2022-02-22 RX ORDER — METOPROLOL SUCCINATE 50 MG/1
50 TABLET, EXTENDED RELEASE ORAL 2 TIMES DAILY
Qty: 60 TABLET | Refills: 6 | Status: SHIPPED
Start: 2022-02-22 | End: 2022-09-27 | Stop reason: SDUPTHER

## 2022-02-22 RX ORDER — HYDROCHLOROTHIAZIDE 12.5 MG/1
12.5 TABLET ORAL DAILY
Qty: 30 TABLET | Refills: 6 | Status: SHIPPED
Start: 2022-02-22 | End: 2022-09-27 | Stop reason: SDUPTHER

## 2022-02-22 RX ORDER — SIMVASTATIN 20 MG
20 TABLET ORAL DAILY
Qty: 30 TABLET | Refills: 6 | Status: SHIPPED
Start: 2022-02-22 | End: 2022-09-27 | Stop reason: SDUPTHER

## 2022-02-22 RX ORDER — AMLODIPINE BESYLATE 10 MG/1
10 TABLET ORAL DAILY
Qty: 30 TABLET | Refills: 6 | Status: SHIPPED
Start: 2022-02-22 | End: 2022-09-27 | Stop reason: SDUPTHER

## 2022-02-22 RX ORDER — GLIPIZIDE 5 MG/1
TABLET, FILM COATED, EXTENDED RELEASE ORAL
Qty: 90 TABLET | Refills: 6 | Status: CANCELLED | OUTPATIENT
Start: 2022-02-22

## 2022-02-22 RX ORDER — LISINOPRIL 40 MG/1
40 TABLET ORAL DAILY
Qty: 30 TABLET | Refills: 6 | Status: SHIPPED
Start: 2022-02-22 | End: 2022-09-27 | Stop reason: SDUPTHER

## 2022-02-22 NOTE — PROGRESS NOTES
Traci Tiana : 1970 Sex: female  Age: 47 y.o. Chief Complaint   Patient presents with    Hypertension    Diabetes       HPI  HPI:    Patient presents today for follow-up. Overall feeling very well. BS excellent at home w/o glipizide. BP's have been excellent at home. Did not get bw yet. Works 4a-12p 7 days a week. Had moderna x 3. Declines flu vaccine or pneumovax           Review of Systems  ROS:  Const: Denies chills, fever, malaise and sweats. Eyes: Denies discharge, pain, redness and visual disturbance. ENMT: Denies earaches, other ear symptoms. Denies nasal or sinus symptoms other than stated  above. Denies mouth and tongue lesions and sore throat. CV: Denies chest discomfort, pain; diaphoresis, dizziness, edema, lightheadedness, orthopnea,  palpitations, syncope and near syncopal episode or any exertional symptoms  Resp: Denies cough, hemoptysis, pleuritic pain, SOB, sputum production and wheezing. GI: Denies abdominal pain, change in bowel habits, hematochezia, melena, nausea and vomiting. : Denies urinary symptoms including dysuria , urgency, frequency or hematuria. Musculo: Denies musculoskeletal symptoms. Skin: Denies bruising and rash.   Neuro: Denies headache, numbness, stiff neck, tingling and focal weakness slurred speech or facial  droop  Hema/Lymph: Denies bleeding/bruising tendency and enlarged lymph nodes        Current Outpatient Medications:     amLODIPine (NORVASC) 10 MG tablet, Take 1 tablet by mouth daily, Disp: 30 tablet, Rfl: 6    hydroCHLOROthiazide (HYDRODIURIL) 12.5 MG tablet, Take 1 tablet by mouth daily, Disp: 30 tablet, Rfl: 6    lisinopril (PRINIVIL;ZESTRIL) 40 MG tablet, Take 1 tablet by mouth daily, Disp: 30 tablet, Rfl: 6    metFORMIN (GLUCOPHAGE) 1000 MG tablet, Take 1 tablet by mouth 2 times daily (with meals), Disp: 60 tablet, Rfl: 6    metoprolol succinate (TOPROL XL) 50 MG extended release tablet, Take 1 tablet by mouth 2 times daily, Disp: 60 tablet, Rfl: 6    simvastatin (ZOCOR) 20 MG tablet, Take 1 tablet by mouth daily, Disp: 30 tablet, Rfl: 6    SITagliptin (JANUVIA) 100 MG tablet, Take 1 tablet by mouth daily, Disp: 30 tablet, Rfl: 6    glucose monitoring kit (FREESTYLE) monitoring kit, 1 kit by Does not apply route daily, Disp: 1 kit, Rfl: 0    Lancets MISC, 1 each by Does not apply route 2 times daily, Disp: 200 each, Rfl: 3    blood glucose monitor strips, Test 1-2times qd, Disp: 200 strip, Rfl: 1  Allergies   Allergen Reactions    Potassium-Containing Compounds        No past medical history on file. No past surgical history on file. Family History   Problem Relation Age of Onset    Uterine Cancer Maternal Aunt      Social History     Tobacco Use    Smoking status: Former Smoker     Packs/day: 1.50     Years: 4.00     Pack years: 6.00     Types: Cigarettes    Smokeless tobacco: Never Used   Substance Use Topics    Alcohol use: Not on file    Drug use: Not on file      Social History     Social History Narrative    PMH:    Problem List: Essential hypertension    Health Maintenance:    Mammogram - (2019)    Mammogram Screening - (2019)    Medical Problems:    Hypertension    Tavo - CPAP    gestational DM, Type 2 Diabetes    Surgical Hx:     Section    Reviewed, no changes. FH:        Father:    . (Hx)    Mother:    . (Hx)Mom - DM, HTN    Dad - doesn't know    Reviewed, no changes. SH:    . 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 Alcohol: Denies alcohol    use. Reviewed, no changes.         Vitals:    22 1340   BP: 132/72   Site: Right Upper Arm   Position: Sitting   Pulse: 90   Temp: 97.4 °F (36.3 °C)   TempSrc: Temporal   SpO2: 98%   Weight: 216 lb 9.6 oz (98.2 kg)      Wt Readings from Last 3 Encounters:   22 216 lb 9.6 oz (98.2 kg) 07/06/21 206 lb (93.4 kg)   04/02/21 206 lb (93.4 kg)          Exam:  Const: Appears comfortable. No signs of acute distress present. Head/Face: Atraumatic on inspection. Eyes: EOMI in both eyes. No discharge from the eyes. PERRL. Sclerae clear. ENMT: Auditory canals normal. Tympanic membranes: intact and translucent. External nose WNL. Nasal mucosa is clear. Oropharynx: No erythema or exudate. Posterior pharynx is normal.  Neck: Supple. Palpation reveals no adenopathy. No masses appreciated. No JVD. Carotids: no  bruits. Resp: Respirations are unlabored. Clear to auscultation. No rales, rhonchi or wheezes appreciated  over the lungs bilaterally. CV: Rate is regular. Rhythm is regular. No gallop or rubs. No heart murmur appreciated. Extremities: No clubbing, cyanosis, or edema. No calf inflammation or tenderness. Abdomen: Bowel sounds are normoactive. Abdomen is soft, nontender, and nondistended. No  abdominal masses. No palpable hepatosplenomegaly. Lymph: No palpable or visible regional lymphadenopathy. Musculoskeletal: no acute joint inflammation. Skin: Dry and warm with no rash. Skin normal to inspection and palpation overall. Neuro: Alert and oriented. Affect: appropriate. Upper Extremities: 5/5 bilaterally. Lower Extremities:  5/5 bilaterally. Sensation intact to light touch. Reflexes: DTR's are symmetric and 2+ bilaterally. .  Cranial Nerves: Cranial nerves grossly intact. Assessment and Plan:   Diagnosis Orders   1. Essential hypertension  amLODIPine (NORVASC) 10 MG tablet    hydroCHLOROthiazide (HYDRODIURIL) 12.5 MG tablet    lisinopril (PRINIVIL;ZESTRIL) 40 MG tablet    metoprolol succinate (TOPROL XL) 50 MG extended release tablet   2. Type 2 diabetes mellitus with hyperglycemia, without long-term current use of insulin (HCC)  metFORMIN (GLUCOPHAGE) 1000 MG tablet    SITagliptin (JANUVIA) 100 MG tablet   3. Mixed hyperlipidemia  simvastatin (ZOCOR) 20 MG tablet    CK   4.  Health maintenance examination     5. Thrombophilia ([facility] [address]     6. CHINO (obstructive sleep apnea)     7. Liver disease         Essential hypertension  Says excellent at home. Counseled. The risks of hypertension and hypotension reviewed. Watch closely ambulatory. Hyper and hypotensive precautions and parameters reviewed and written as well as parameters on pulse, call if out of range, ER dangers numbers. Lifestyle modification reviewed. Tolerating therapy. Type 2 diabetes mellitus with hyperglycemia, without long-term current use of insulin ([facility])  Hemoglobin A1c significantly improved last time, goals reviewed. Tolerating therapy . Not interested in seeing endocrinologist.  Lifestyle location emphasized. Hyper and hypoglycemic precautions reviewed. She's had some diabetic retinopathy needs to follow  closely with ophthamology. Watch ambulatory. If out of range, let us know. Stressed importance of daily foot examinations,  regular eye examinations etc. micro-and macrovascular complications reviewed . hyper and  hypoglycemic precautions reviewed at length. Janumet expensive . consider jardiance, declines change now  Declines flu shot or pneumovax    BS have been excellent she states despite stopping glipizide (previous XL 5mg 2 QAM, 1 QPM), tolerating metformin and januvia, standard precautions reviewed incl la/diarrhea, thryoid, pancreatic, declines fmh pancreatic or thyroid ca    Thrombophilia ([facility] [address]  Counseled extensively. Differential reviewed, including serious etiologies. . Repeat. Mild, stable. Monitor    Mixed hyperlipidemia  lifestyle modification reviewed. risk of hyperlipidemia reviewed tolerating therapy. Stable. Declines change in therapy. Await repeat. Newer agent statins and various potencies of statins reviewed      CHINO (obstructive sleep apnea)  Compliant. Feels very well with it, asymptomatic. Uses at least 6 to 8 hours per night 7 nights per week. Liver disease  Counseled extensively. Differential reviewed, including serious etiologies. Likely fatty liver. Precautions reviewed. Lifestyle modification appropriate diet and weight loss reviewed. Risks of  even this   reviewed. Hepatitis C screen negative. Awaiting repeat lfts, defers otherwise. Health maintenance examination  Counseled at length  7/21. Encourage yearly. Declines colon ca screening/fit test. mammo ordered          No flowsheet data found. Plan as above. Counseled extensively and differential diagnoses relevant to above were reviewed, including serious etiologies. Side effects and interactions of medications were reviewed. Counseled, she is planning bw soon but wants to defer fu for 3mos, sooner prn, call for results, fu sooner prn. Precautions reviewed. As long as symptoms steadily improve/resolve, and medical conditions follow the expected course, FU as below, sooner PRN. Return in about 3 months (around 5/22/2022), or if symptoms worsen or fail to improve. Over 30 minutes  spent with the patient in reviewing records, reviewing with patient/family, counseling, ordering,  prescribing, completing h&p, etc., with over 50% of the time spent face to face counseling. Signs and symptoms to watch for discussed, serious signs and symptoms reviewed. ER if any. Annette France MD    Patients are advised to check with insurance company to ensure coverage and to fully understand benefits and cost prior to any testing. This note was created with the assistance of voice recognition software. Document was reviewed however may contain grammatical errors.

## 2022-03-30 DIAGNOSIS — E78.2 MIXED HYPERLIPIDEMIA: ICD-10-CM

## 2022-03-30 DIAGNOSIS — I10 ESSENTIAL HYPERTENSION: ICD-10-CM

## 2022-03-30 DIAGNOSIS — E11.65 TYPE 2 DIABETES MELLITUS WITH HYPERGLYCEMIA, WITHOUT LONG-TERM CURRENT USE OF INSULIN (HCC): ICD-10-CM

## 2022-03-30 LAB
ALBUMIN SERPL-MCNC: 4.2 G/DL (ref 3.5–5.2)
ALP BLD-CCNC: 74 U/L (ref 35–104)
ALT SERPL-CCNC: 16 U/L (ref 0–32)
AMORPHOUS: ABNORMAL
ANION GAP SERPL CALCULATED.3IONS-SCNC: 19 MMOL/L (ref 7–16)
AST SERPL-CCNC: 19 U/L (ref 0–31)
BACTERIA: ABNORMAL /HPF
BASOPHILS ABSOLUTE: 0.05 E9/L (ref 0–0.2)
BASOPHILS RELATIVE PERCENT: 0.5 % (ref 0–2)
BILIRUB SERPL-MCNC: 0.3 MG/DL (ref 0–1.2)
BILIRUBIN URINE: NEGATIVE
BLOOD, URINE: ABNORMAL
BUN BLDV-MCNC: 17 MG/DL (ref 6–20)
CALCIUM SERPL-MCNC: 9.4 MG/DL (ref 8.6–10.2)
CHLORIDE BLD-SCNC: 98 MMOL/L (ref 98–107)
CHOLESTEROL, TOTAL: 185 MG/DL (ref 0–199)
CLARITY: ABNORMAL
CO2: 20 MMOL/L (ref 22–29)
COLOR: YELLOW
CREAT SERPL-MCNC: 0.8 MG/DL (ref 0.5–1)
EOSINOPHILS ABSOLUTE: 0.23 E9/L (ref 0.05–0.5)
EOSINOPHILS RELATIVE PERCENT: 2.3 % (ref 0–6)
GFR AFRICAN AMERICAN: >60
GFR NON-AFRICAN AMERICAN: >60 ML/MIN/1.73
GLUCOSE BLD-MCNC: 143 MG/DL (ref 74–99)
GLUCOSE URINE: NEGATIVE MG/DL
HBA1C MFR BLD: 8.7 % (ref 4–5.6)
HCT VFR BLD CALC: 37.7 % (ref 34–48)
HDLC SERPL-MCNC: 72 MG/DL
HEMOGLOBIN: 12.4 G/DL (ref 11.5–15.5)
IMMATURE GRANULOCYTES #: 0.03 E9/L
IMMATURE GRANULOCYTES %: 0.3 % (ref 0–5)
KETONES, URINE: NEGATIVE MG/DL
LDL CHOLESTEROL CALCULATED: 87 MG/DL (ref 0–99)
LEUKOCYTE ESTERASE, URINE: ABNORMAL
LYMPHOCYTES ABSOLUTE: 2.3 E9/L (ref 1.5–4)
LYMPHOCYTES RELATIVE PERCENT: 23.5 % (ref 20–42)
MCH RBC QN AUTO: 29.6 PG (ref 26–35)
MCHC RBC AUTO-ENTMCNC: 32.9 % (ref 32–34.5)
MCV RBC AUTO: 90 FL (ref 80–99.9)
MONOCYTES ABSOLUTE: 0.74 E9/L (ref 0.1–0.95)
MONOCYTES RELATIVE PERCENT: 7.6 % (ref 2–12)
NEUTROPHILS ABSOLUTE: 6.44 E9/L (ref 1.8–7.3)
NEUTROPHILS RELATIVE PERCENT: 65.8 % (ref 43–80)
NITRITE, URINE: NEGATIVE
PDW BLD-RTO: 13 FL (ref 11.5–15)
PH UA: 5.5 (ref 5–9)
PLATELET # BLD: 387 E9/L (ref 130–450)
PMV BLD AUTO: 9.8 FL (ref 7–12)
POTASSIUM SERPL-SCNC: 3.9 MMOL/L (ref 3.5–5)
PROTEIN UA: NEGATIVE MG/DL
RBC # BLD: 4.19 E12/L (ref 3.5–5.5)
RBC UA: >20 /HPF (ref 0–2)
SODIUM BLD-SCNC: 137 MMOL/L (ref 132–146)
SPECIFIC GRAVITY UA: >=1.03 (ref 1–1.03)
TOTAL CK: 157 U/L (ref 20–180)
TOTAL PROTEIN: 7.6 G/DL (ref 6.4–8.3)
TRIGL SERPL-MCNC: 132 MG/DL (ref 0–149)
TSH SERPL DL<=0.05 MIU/L-ACNC: 2.47 UIU/ML (ref 0.27–4.2)
UROBILINOGEN, URINE: 0.2 E.U./DL
VLDLC SERPL CALC-MCNC: 26 MG/DL
WBC # BLD: 9.8 E9/L (ref 4.5–11.5)
WBC UA: ABNORMAL /HPF (ref 0–5)

## 2022-03-31 NOTE — RESULT ENCOUNTER NOTE
Hemoglobin A1c quite high, stressed diet, follow-up so we can discuss different options for diabetes management. Also significant blood in the urine, any symptoms? When she menstruating? Follow-up.   Notify of symptoms in the meantime

## 2022-04-07 ENCOUNTER — OFFICE VISIT (OUTPATIENT)
Dept: PRIMARY CARE CLINIC | Age: 52
End: 2022-04-07
Payer: COMMERCIAL

## 2022-04-07 VITALS
WEIGHT: 212 LBS | DIASTOLIC BLOOD PRESSURE: 80 MMHG | BODY MASS INDEX: 33.2 KG/M2 | OXYGEN SATURATION: 96 % | TEMPERATURE: 97.8 F | SYSTOLIC BLOOD PRESSURE: 136 MMHG | HEART RATE: 86 BPM

## 2022-04-07 DIAGNOSIS — R31.9 HEMATURIA, UNSPECIFIED TYPE: ICD-10-CM

## 2022-04-07 DIAGNOSIS — K76.9 LIVER DISEASE: ICD-10-CM

## 2022-04-07 DIAGNOSIS — I10 ESSENTIAL HYPERTENSION: ICD-10-CM

## 2022-04-07 DIAGNOSIS — D68.59 THROMBOPHILIA (HCC): ICD-10-CM

## 2022-04-07 DIAGNOSIS — G47.33 OSA (OBSTRUCTIVE SLEEP APNEA): ICD-10-CM

## 2022-04-07 DIAGNOSIS — E78.2 MIXED HYPERLIPIDEMIA: ICD-10-CM

## 2022-04-07 DIAGNOSIS — E11.65 TYPE 2 DIABETES MELLITUS WITH HYPERGLYCEMIA, WITHOUT LONG-TERM CURRENT USE OF INSULIN (HCC): Primary | ICD-10-CM

## 2022-04-07 DIAGNOSIS — Z00.00 HEALTH MAINTENANCE EXAMINATION: ICD-10-CM

## 2022-04-07 PROCEDURE — 3052F HG A1C>EQUAL 8.0%<EQUAL 9.0%: CPT | Performed by: FAMILY MEDICINE

## 2022-04-07 PROCEDURE — 99214 OFFICE O/P EST MOD 30 MIN: CPT | Performed by: FAMILY MEDICINE

## 2022-04-07 SDOH — ECONOMIC STABILITY: FOOD INSECURITY: WITHIN THE PAST 12 MONTHS, YOU WORRIED THAT YOUR FOOD WOULD RUN OUT BEFORE YOU GOT MONEY TO BUY MORE.: NEVER TRUE

## 2022-04-07 SDOH — ECONOMIC STABILITY: FOOD INSECURITY: WITHIN THE PAST 12 MONTHS, THE FOOD YOU BOUGHT JUST DIDN'T LAST AND YOU DIDN'T HAVE MONEY TO GET MORE.: NEVER TRUE

## 2022-04-07 ASSESSMENT — PATIENT HEALTH QUESTIONNAIRE - PHQ9
SUM OF ALL RESPONSES TO PHQ QUESTIONS 1-9: 0
1. LITTLE INTEREST OR PLEASURE IN DOING THINGS: 0
SUM OF ALL RESPONSES TO PHQ QUESTIONS 1-9: 0
2. FEELING DOWN, DEPRESSED OR HOPELESS: 0
SUM OF ALL RESPONSES TO PHQ9 QUESTIONS 1 & 2: 0

## 2022-04-07 ASSESSMENT — SOCIAL DETERMINANTS OF HEALTH (SDOH): HOW HARD IS IT FOR YOU TO PAY FOR THE VERY BASICS LIKE FOOD, HOUSING, MEDICAL CARE, AND HEATING?: NOT HARD AT ALL

## 2022-04-07 NOTE — PROGRESS NOTES
Carrie Castro : 1970 Sex: female  Age: 47 y.o. Chief Complaint   Patient presents with    Discuss Labs       HPI:    Patient presents today for follow-up of blood work. Overall feeling very well. Blood sugar not controlled. Feels well.        Blood work reviewed, CO2 20 anion gap 19 glucose 143 HDL 72 LDL 87 triglyceride 132 hemoglobin A1c up to 8.7 TSH 2.4 CBC with differential normal urinalysis shows small blood greater than 20 RBC-was menstruating    Most Recent Labs  CBC  Lab Results   Component Value Date    WBC 9.8 2022    WBC 8.4 2021    WBC 8.6 2020    RBC 4.19 2022    RBC 4.12 2021    RBC 4.81 2020    HGB 12.4 2022    HGB 12.4 2021    HGB 13.7 2020    HCT 37.7 2022    HCT 37.0 2021    HCT 42.8 2020    MCV 90.0 2022    MCV 89.8 2021    MCV 89.0 2020     2022     2021     2020      CMP  Lab Results   Component Value Date     2022     2021     2020    K 3.9 2022    K 3.8 2021    K 4.4 2020    CL 98 2022    CL 99 2021    CL 98 2020    CO2 20 2022    CO2 28 2021    CO2 22 2020    ANIONGAP 19 2022    ANIONGAP 1.6 2021    ANIONGAP 16 2020    GLUCOSE 143 2022    GLUCOSE 128 2021    GLUCOSE 217 2020    BUN 17 2022    BUN 23 2021    BUN 13 2020    CREATININE 0.8 2022    CREATININE 0.92 2021    CREATININE 0.7 2020    LABGLOM >60 2022    LABGLOM 72 2021    LABGLOM >60 2020    GFRAA >60 2022    GFRAA >60 2020    CALCIUM 9.4 2022    CALCIUM 9.6 2021    CALCIUM 9.6 2020    PROT 7.6 2022    PROT 7.8 2020    LABALBU 4.2 2022    LABALBU 4.4 2021    LABALBU 4.4 2020    BILITOT 0.3 2022    BILITOT 0.5 2021    BILITOT 0.3 2020 ALKPHOS 74 03/30/2022    ALKPHOS 64 07/02/2021    ALKPHOS 74 02/19/2020    AST 19 03/30/2022    AST 18 07/02/2021    AST 19 02/19/2020    ALT 16 03/30/2022    ALT 21 07/02/2021    ALT 26 02/19/2020     A1C  Lab Results   Component Value Date    LABA1C 8.7 03/30/2022    LABA1C 7.5 07/02/2021    LABA1C 10.2 02/19/2020     TSH  Lab Results   Component Value Date    TSH 2.470 03/30/2022    TSH 2.95 07/08/2021    TSH 2.210 02/19/2020     FREET4  No results found for: B1JLXGD  LIPID  Lab Results   Component Value Date    CHOL 185 03/30/2022    CHOL 198 07/02/2021    CHOL 156 02/19/2020    HDL 72 03/30/2022    HDL 76 07/02/2021    HDL 50 02/19/2020    LDLCALC 87 03/30/2022    LDLCALC 96 07/02/2021    LDLCALC 71 02/19/2020    TRIG 132 03/30/2022    TRIG 154 07/02/2021    TRIG 174 02/19/2020    CHOLHDLRATIO 2.6 07/02/2021     VITAMIN D  No results found for: VITD25  MAGNESIUM  No results found for: MG   PHOS  No results found for: PHOS   NIDHI   No results found for: NIDHI  RHEUMATOID FACTOR  No results found for: RF  PSA  No results found for: PSA   HEPATITIS C  No results found for: HCVABI  HIV  No results found for: NVC9BNV, HIV1QT  UA  Lab Results   Component Value Date    COLORU Yellow 03/30/2022    COLORU Yellow 07/02/2021    COLORU Yellow 02/19/2020    CLARITYU CLOUDY 03/30/2022    CLARITYU Clear 07/02/2021    CLARITYU Clear 02/19/2020    GLUCOSEU Negative 03/30/2022    GLUCOSEU neg 07/02/2021    GLUCOSEU Negative 02/19/2020    BILIRUBINUR Negative 03/30/2022    BILIRUBINUR Negative 07/02/2021    BILIRUBINUR Negative 02/19/2020    KETUA Negative 03/30/2022    KETUA Negative 07/02/2021    KETUA Negative 02/19/2020    SPECGRAV >=1.030 03/30/2022    SPECGRAV >=1.030 02/19/2020    BLOODU SMALL 03/30/2022    BLOODU Negative 07/02/2021    BLOODU TRACE-INTACT 02/19/2020    PHUR 5.5 03/30/2022    PHUR 5.5 07/02/2021    PHUR 5.0 02/19/2020    PROTEINU Negative 03/30/2022    PROTEINU Negative 07/02/2021    PROTEINU Negative 6    metoprolol succinate (TOPROL XL) 50 MG extended release tablet, Take 1 tablet by mouth 2 times daily, Disp: 60 tablet, Rfl: 6    simvastatin (ZOCOR) 20 MG tablet, Take 1 tablet by mouth daily, Disp: 30 tablet, Rfl: 6    SITagliptin (JANUVIA) 100 MG tablet, Take 1 tablet by mouth daily, Disp: 30 tablet, Rfl: 6    glucose monitoring kit (FREESTYLE) monitoring kit, 1 kit by Does not apply route daily, Disp: 1 kit, Rfl: 0    Lancets MISC, 1 each by Does not apply route 2 times daily, Disp: 200 each, Rfl: 3    blood glucose monitor strips, Test 1-2times qd, Disp: 200 strip, Rfl: 1  Allergies   Allergen Reactions    Potassium-Containing Compounds        History reviewed. No pertinent past medical history. History reviewed. No pertinent surgical history. Family History   Problem Relation Age of Onset    Uterine Cancer Maternal Aunt      Social History     Tobacco Use    Smoking status: Former Smoker     Packs/day: 1.50     Years: 4.00     Pack years: 6.00     Types: Cigarettes     Quit date: 1991     Years since quittin.2    Smokeless tobacco: Never Used   Substance Use Topics    Alcohol use: Not on file    Drug use: Not on file      Social History     Social History Narrative    PMH:    Problem List: Essential hypertension    Health Maintenance:    Mammogram - (2019)    Mammogram Screening - (2019)    Medical Problems:    Hypertension    [place] - CPAP    gestational DM, Type 2 Diabetes    Surgical Hx:     Section    Reviewed, no changes. FH:        Father:    . (Hx)    Mother:    . (Hx)Mom - DM, HTN    Dad - doesn't know    Reviewed, no changes. SH:    . 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 Alcohol: Denies alcohol    use. Reviewed, no changes.         Vitals:    22 1111   BP: 136/80   Pulse: 86   Temp: 97.8 °F (36.6 °C)   SpO2: 96%   Weight: 212 lb (96.2 kg)      Wt Readings from Last 3 Encounters:   04/07/22 212 lb (96.2 kg)   02/22/22 216 lb 9.6 oz (98.2 kg)   07/06/21 206 lb (93.4 kg)          Exam:  Const: Appears comfortable. No signs of acute distress present. Head/Face: Atraumatic on inspection. Eyes: EOMI in both eyes. No discharge from the eyes. PERRL. Sclerae clear. ENMT: Auditory canals normal. Tympanic membranes: intact and translucent. External nose WNL. Nasal mucosa is clear. Oropharynx: No erythema or exudate. Posterior pharynx is normal.  Neck: Supple. Palpation reveals no adenopathy. No masses appreciated. No JVD. Carotids: no  bruits. Resp: Respirations are unlabored. Clear to auscultation. No rales, rhonchi or wheezes appreciated  over the lungs bilaterally. CV: Rate is regular. Rhythm is regular. No gallop or rubs. No heart murmur appreciated. Extremities: No clubbing, cyanosis, or edema. No calf inflammation or tenderness. Abdomen: Bowel sounds are normoactive. Abdomen is soft, nontender, and nondistended. No  abdominal masses. No palpable hepatosplenomegaly. Lymph: No palpable or visible regional lymphadenopathy. Musculoskeletal: no acute joint inflammation. Skin: Dry and warm with no rash. Skin normal to inspection and palpation overall. Neuro: Alert and oriented. Affect: appropriate. Upper Extremities: 5/5 bilaterally. Lower Extremities:  5/5 bilaterally. Sensation intact to light touch. Reflexes: DTR's are symmetric and 2+ bilaterally. .  Cranial Nerves: Cranial nerves grossly intact. Assessment and Plan:   Diagnosis Orders   1. Type 2 diabetes mellitus with hyperglycemia, without long-term current use of insulin (HCC)  Semaglutide,0.25 or 0.5MG/DOS, 2 MG/1.5ML SOPN    TSH    Comprehensive Metabolic Panel    CBC with Auto Differential    Hemoglobin A1C    Urinalysis    Microalbumin / Creatinine Urine Ratio   2. Hematuria, unspecified type     3.  Essential hypertension  CBC with Auto Differential   4. Mixed hyperlipidemia  Lipid Panel    Comprehensive Metabolic Panel    CBC with Auto Differential    CK   5. Health maintenance examination     6. Thrombophilia ([facility] [address]     7. CHINO (obstructive sleep apnea)     8. Liver disease         Essential hypertension  Says excellent at home. Counseled. The risks of hypertension and hypotension reviewed. Watch closely ambulatory. Hyper and hypotensive precautions and parameters reviewed and written as well as parameters on pulse, call if out of range, ER dangers numbers. Lifestyle modification reviewed. Tolerating therapy. Continue current therapy    Type 2 diabetes mellitus with hyperglycemia, without long-term current use of insulin ([facility] [address]  Not controlled. Tolerating therapy . Not interested in seeing endocrinologist.  Lifestyle location emphasized. Hyper and hypoglycemic precautions reviewed. She's had some diabetic retinopathy needs to follow  closely with ophthamology. Watch ambulatory. If out of range, let us know. Stressed importance of daily foot examinations,  regular eye examinations etc. micro-and macrovascular complications reviewed . hyper and  hypoglycemic precautions reviewed at length. Previously on glipizide, she did stop this did not feel it was effective. Risk of glipizide reviewed include hypoglycemia. She is currently on Metformin with standard precaution including LA/diarrhea, and Januvia with standard precautions. She is not interested in starting [place], she feels she would have a high likelihood of yeast infection and other issues. After discussion shared decision making she would like to switch Januvia to Ozempic with standard precautions, denies personal or family history of thyroid CA/pancreatic issues. Start 0.25 weekly, instructions on use reviewed. She symptoms to keep her follow-up May 24 and we will likely increase to 0.5 at that time    Thrombophilia [facility])  Counseled extensively.  Differential reviewed, including serious etiologies. . Repeat. Mild, stable. Monitor    Mixed hyperlipidemia  lifestyle modification reviewed. risk of hyperlipidemia reviewed tolerating therapy. Stable. Dose-dependent benefit reviewed, various potency statins reviewed, defers change in therapy. Continue current therapy. Side effects instructions ADRs reviewed discontinue if any    CHINO (obstructive sleep apnea)  Compliant. Feels very well with it, asymptomatic. Uses at least 6 to 8 hours per night 7 nights per week. Liver disease  Counseled extensively. Differential reviewed, including serious etiologies. Likely fatty liver. Precautions reviewed. Lifestyle modification appropriate diet and weight loss reviewed. Risks of  even this   reviewed. Hepatitis C screen negative. Repeat LFTs normal    Health maintenance examination  Counseled at length  7/21. Encourage yearly. Declines colon ca screening/fit test. mammo ordered    Hematuria  Menstruating. Recheck with next blood work      No flowsheet data found. Plan as above. Counseled extensively and differential diagnoses relevant to above were reviewed, including serious etiologies. Side effects and interactions of medications were reviewed. Counseled, discontinue Januvia add Ozempic with standard precautions. Coupon provided. Keep follow-up May 24, likely increase to 0.5 then. Defers blood work for 3 months with plan follow-up then as well sooner as needed      As long as symptoms steadily improve/resolve, and medical conditions follow the expected course, FU as below, sooner PRN. Return in about 3 months (around 7/7/2022), or if symptoms worsen or fail to improve. Over 30 minutes  spent with the patient in reviewing records, reviewing with patient/family, counseling, ordering,  prescribing, completing h&p, etc., with over 50% of the time spent face to face counseling. Signs and symptoms to watch for discussed, serious signs and symptoms reviewed.   ER if any.         Keo Brower MD    Patients are advised to check with insurance company to ensure coverage and to fully understand benefits and cost prior to any testing. This note was created with the assistance of voice recognition software. Document was reviewed however may contain grammatical errors.

## 2022-04-08 ENCOUNTER — TELEPHONE (OUTPATIENT)
Dept: PRIMARY CARE CLINIC | Age: 52
End: 2022-04-08

## 2022-05-24 ENCOUNTER — OFFICE VISIT (OUTPATIENT)
Dept: PRIMARY CARE CLINIC | Age: 52
End: 2022-05-24
Payer: COMMERCIAL

## 2022-05-24 VITALS
WEIGHT: 205 LBS | BODY MASS INDEX: 32.11 KG/M2 | DIASTOLIC BLOOD PRESSURE: 70 MMHG | SYSTOLIC BLOOD PRESSURE: 124 MMHG | HEART RATE: 97 BPM | TEMPERATURE: 97.5 F | OXYGEN SATURATION: 98 %

## 2022-05-24 DIAGNOSIS — E78.2 MIXED HYPERLIPIDEMIA: ICD-10-CM

## 2022-05-24 DIAGNOSIS — D68.59 THROMBOPHILIA (HCC): ICD-10-CM

## 2022-05-24 DIAGNOSIS — K76.9 LIVER DISEASE: ICD-10-CM

## 2022-05-24 DIAGNOSIS — I10 ESSENTIAL HYPERTENSION: ICD-10-CM

## 2022-05-24 DIAGNOSIS — E11.65 TYPE 2 DIABETES MELLITUS WITH HYPERGLYCEMIA, WITHOUT LONG-TERM CURRENT USE OF INSULIN (HCC): Primary | ICD-10-CM

## 2022-05-24 DIAGNOSIS — Z00.00 HEALTH MAINTENANCE EXAMINATION: ICD-10-CM

## 2022-05-24 DIAGNOSIS — G47.33 OSA (OBSTRUCTIVE SLEEP APNEA): ICD-10-CM

## 2022-05-24 PROCEDURE — 3052F HG A1C>EQUAL 8.0%<EQUAL 9.0%: CPT | Performed by: FAMILY MEDICINE

## 2022-05-24 PROCEDURE — 99214 OFFICE O/P EST MOD 30 MIN: CPT | Performed by: FAMILY MEDICINE

## 2022-05-24 ASSESSMENT — PATIENT HEALTH QUESTIONNAIRE - PHQ9
2. FEELING DOWN, DEPRESSED OR HOPELESS: 0
1. LITTLE INTEREST OR PLEASURE IN DOING THINGS: 0
SUM OF ALL RESPONSES TO PHQ QUESTIONS 1-9: 0
SUM OF ALL RESPONSES TO PHQ QUESTIONS 1-9: 0
SUM OF ALL RESPONSES TO PHQ9 QUESTIONS 1 & 2: 0
SUM OF ALL RESPONSES TO PHQ QUESTIONS 1-9: 0
SUM OF ALL RESPONSES TO PHQ QUESTIONS 1-9: 0

## 2022-05-24 NOTE — PROGRESS NOTES
Hesham Markham : 1970 Sex: female  Age: 47 y.o. Chief Complaint   Patient presents with    Medication Check       HPI:    Patient presents today for follow-up of Ozempic, fasting blood sugar stayed about the same but tolerating Ozempic well, 0.25 mg. Last blood work again reviewed.       Most Recent Labs  CBC  Lab Results   Component Value Date    WBC 9.8 2022    WBC 8.4 2021    WBC 8.6 2020    RBC 4.19 2022    RBC 4.12 2021    RBC 4.81 2020    HGB 12.4 2022    HGB 12.4 2021    HGB 13.7 2020    HCT 37.7 2022    HCT 37.0 2021    HCT 42.8 2020    MCV 90.0 2022    MCV 89.8 2021    MCV 89.0 2020     2022     2021     2020      CMP  Lab Results   Component Value Date     2022     2021     2020    K 3.9 2022    K 3.8 2021    K 4.4 2020    CL 98 2022    CL 99 2021    CL 98 2020    CO2 20 2022    CO2 28 2021    CO2 22 2020    ANIONGAP 19 2022    ANIONGAP 1.6 2021    ANIONGAP 16 2020    GLUCOSE 143 2022    GLUCOSE 128 2021    GLUCOSE 217 2020    BUN 17 2022    BUN 23 2021    BUN 13 2020    CREATININE 0.8 2022    CREATININE 0.92 2021    CREATININE 0.7 2020    LABGLOM >60 2022    LABGLOM 72 2021    LABGLOM >60 2020    GFRAA >60 2022    GFRAA >60 2020    CALCIUM 9.4 2022    CALCIUM 9.6 2021    CALCIUM 9.6 2020    PROT 7.6 2022    PROT 7.8 2020    LABALBU 4.2 2022    LABALBU 4.4 2021    LABALBU 4.4 2020    BILITOT 0.3 2022    BILITOT 0.5 2021    BILITOT 0.3 2020    ALKPHOS 74 2022    ALKPHOS 64 2021    ALKPHOS 74 2020    AST 19 2022    AST 18 2021    AST 19 2020    ALT 16 2022    ALT 21 07/02/2021    ALT 26 02/19/2020     A1C  Lab Results   Component Value Date    LABA1C 8.7 03/30/2022    LABA1C 7.5 07/02/2021    LABA1C 10.2 02/19/2020     TSH  Lab Results   Component Value Date    TSH 2.470 03/30/2022    TSH 2.95 07/08/2021    TSH 2.210 02/19/2020     FREET4  No results found for: [id number]  LIPID  Lab Results   Component Value Date    CHOL 185 03/30/2022    CHOL 198 07/02/2021    CHOL 156 02/19/2020    HDL 72 03/30/2022    HDL 76 07/02/2021    HDL 50 02/19/2020    LDLCALC 87 03/30/2022    LDLCALC 96 07/02/2021    LDLCALC 71 02/19/2020    TRIG 132 03/30/2022    TRIG 154 07/02/2021    TRIG 174 02/19/2020    CHOLHDLRATIO 2.6 07/02/2021     VITAMIN D  No results found for: VITD25  MAGNESIUM  No results found for: MG   PHOS  No results found for: PHOS   NIDHI   No results found for: NIDHI  RHEUMATOID FACTOR  No results found for: RF  PSA  No results found for: PSA   HEPATITIS C  No results found for: HCVABI  HIV  No results found for: KSP7ETN, HIV1QT  UA  Lab Results   Component Value Date    COLORU Yellow 03/30/2022    COLORU Yellow 07/02/2021    COLORU Yellow 02/19/2020    CLARITYU CLOUDY 03/30/2022    CLARITYU Clear 07/02/2021    CLARITYU Clear 02/19/2020    GLUCOSEU Negative 03/30/2022    GLUCOSEU neg 07/02/2021    GLUCOSEU Negative 02/19/2020    BILIRUBINUR Negative 03/30/2022    BILIRUBINUR Negative 07/02/2021    BILIRUBINUR Negative 02/19/2020    KETUA Negative 03/30/2022    KETUA Negative 07/02/2021    KETUA Negative 02/19/2020    SPECGRAV >=1.030 03/30/2022    SPECGRAV >=1.030 02/19/2020    BLOODU SMALL 03/30/2022    BLOODU Negative 07/02/2021    BLOODU TRACE-INTACT 02/19/2020    PHUR 5.5 03/30/2022    PHUR 5.5 07/02/2021    PHUR 5.0 02/19/2020    PROTEINU Negative 03/30/2022    PROTEINU Negative 07/02/2021    PROTEINU Negative 02/19/2020    UROBILINOGEN 0.2 03/30/2022    UROBILINOGEN Normal 07/02/2021    UROBILINOGEN 0.2 02/19/2020    NITRU Negative 03/30/2022    NITRU Negative 07/02/2021    NITRU Negative 02/19/2020    LEUKOCYTESUR TRACE 03/30/2022    LEUKOCYTESUR Negative 07/02/2021    LEUKOCYTESUR Negative 02/19/2020     Urine Micro/Albumin Ratio  Lab Results   Component Value Date    MALBCR 6 07/02/2021    MALBCR - 02/19/2020       ROS:  Const: Denies chills, fever, malaise and sweats. Eyes: Denies discharge, pain, redness and visual disturbance. ENMT: Denies earaches, other ear symptoms. Denies nasal or sinus symptoms other than stated  above. Denies mouth and tongue lesions and sore throat. CV: Denies chest discomfort, pain; diaphoresis, dizziness, edema, lightheadedness, orthopnea,  palpitations, syncope and near syncopal episode or any exertional symptoms  Resp: Denies cough, hemoptysis, pleuritic pain, SOB, sputum production and wheezing. GI: Denies abdominal pain, change in bowel habits, hematochezia, melena, nausea and vomiting. : Denies urinary symptoms including dysuria , urgency, frequency or hematuria. Musculo: Denies musculoskeletal symptoms. Skin: Denies bruising and rash.   Neuro: Denies headache, numbness, stiff neck, tingling and focal weakness slurred speech or facial  droop  Hema/Lymph: Denies bleeding/bruising tendency and enlarged lymph nodes        Current Outpatient Medications:     Semaglutide,0.25 or 0.5MG/DOS, 2 MG/1.5ML SOPN, Inject 0.5 mg into the skin once a week, Disp: 1 pen, Rfl: 3    amLODIPine (NORVASC) 10 MG tablet, Take 1 tablet by mouth daily, Disp: 30 tablet, Rfl: 6    hydroCHLOROthiazide (HYDRODIURIL) 12.5 MG tablet, Take 1 tablet by mouth daily, Disp: 30 tablet, Rfl: 6    lisinopril (PRINIVIL;ZESTRIL) 40 MG tablet, Take 1 tablet by mouth daily, Disp: 30 tablet, Rfl: 6    metFORMIN (GLUCOPHAGE) 1000 MG tablet, Take 1 tablet by mouth 2 times daily (with meals), Disp: 60 tablet, Rfl: 6    metoprolol succinate (TOPROL XL) 50 MG extended release tablet, Take 1 tablet by mouth 2 times daily, Disp: 60 tablet, Rfl: 6    simvastatin (ZOCOR) 20 MG tablet, Take 1 tablet by mouth daily, Disp: 30 tablet, Rfl: 6    glucose monitoring kit (FREESTYLE) monitoring kit, 1 kit by Does not apply route daily, Disp: 1 kit, Rfl: 0    Lancets MISC, 1 each by Does not apply route 2 times daily, Disp: 200 each, Rfl: 3    blood glucose monitor strips, Test 1-2times qd, Disp: 200 strip, Rfl: 1  Allergies   Allergen Reactions    Potassium-Containing Compounds        No past medical history on file. No past surgical history on file. Family History   Problem Relation Age of Onset    Uterine Cancer Maternal Aunt      Social History     Tobacco Use    Smoking status: Former Smoker     Packs/day: 1.50     Years: 4.00     Pack years: 6.00     Types: Cigarettes     Quit date: 1991     Years since quittin.4    Smokeless tobacco: Never Used   Substance Use Topics    Alcohol use: Not on file    Drug use: Not on file      Social History     Social History Narrative    PMH:    Problem List: Essential hypertension    Health Maintenance:    Mammogram - (2019)    Mammogram Screening - (2019)    Medical Problems:    Hypertension    [place] - CPAP    gestational DM, Type 2 Diabetes    Surgical Hx:     Section    Reviewed, no changes. FH:        Father:    . (Hx)    Mother:    . (Hx)Mom - DM, HTN    Dad - doesn't know    Reviewed, no changes. SH:    . 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 Alcohol: Denies alcohol    use. Reviewed, no changes. Vitals:    22 1326   BP: 124/70   Pulse: 97   Temp: 97.5 °F (36.4 °C)   SpO2: 98%   Weight: 205 lb (93 kg)      Wt Readings from Last 3 Encounters:   22 205 lb (93 kg)   22 212 lb (96.2 kg)   22 216 lb 9.6 oz (98.2 kg)          Exam:  Const: Appears comfortable. No signs of acute distress present. Head/Face: Atraumatic on inspection.   Eyes: Continue current therapy    Type 2 diabetes mellitus with hyperglycemia, without long-term current use of insulin (HCC)  Still uncontrolled. Tolerating therapy . Not interested in seeing endocrinologist.  Lifestyle location emphasized. Hyper and hypoglycemic precautions reviewed. She's had some diabetic retinopathy needs to follow  closely with ophthamology. Watch ambulatory. If out of range, let us know. Stressed importance of daily foot examinations,  regular eye examinations etc. micro-and macrovascular complications reviewed . hyper and  hypoglycemic precautions reviewed at length. Previously on glipizide, she did stop this did not feel it was effective. Risk of glipizide reviewed include hypoglycemia. She is currently on Metformin with standard precaution including LA/diarrhea, and Januvia with standard precautions. She is not interested in starting [place], she feels she would have a high likelihood of yeast infection and other issues. Last visit, switch Januvia to low-dose Ozempic 0.25, tolerating, sugar still uncontrolled, increase to 0.5   with standard precautions, denies personal or family history of thyroid CA/pancreatic issues. Thrombophilia ([facility] [address]  Counseled extensively. Differential reviewed, including serious etiologies. . Repeat. Mild, stable. Monitor    Mixed hyperlipidemia  lifestyle modification reviewed. risk of hyperlipidemia reviewed tolerating therapy. Stable. Dose-dependent benefit reviewed, various potency statins reviewed, defers change in therapy. Continue current therapy. Side effects instructions ADRs reviewed discontinue if any    CHINO (obstructive sleep apnea)  Compliant. Feels very well with it, asymptomatic. Uses at least 6 to 8 hours per night 7 nights per week. Liver disease  Counseled extensively. Differential reviewed, including serious etiologies. Likely fatty liver. Precautions reviewed.  Lifestyle modification appropriate diet and weight loss reviewed. Risks of  even this   reviewed. Hepatitis C screen negative. Repeat LFTs normal    Health maintenance examination  Counseled at length  7/21. Encourage yearly. Declines colon ca screening/fit test. mammo ordered    Hematuria  Menstruating. Recheck with next blood work      No flowsheet data found. Plan as above. Counseled extensively and differential diagnoses relevant to above were reviewed, including serious etiologies. Side effects and interactions of medications were reviewed. Counseled, increase Ozempic to 0.5. Blood work follow-up 2 months sooner as needed    As long as symptoms steadily improve/resolve, and medical conditions follow the expected course, FU as below, sooner PRN. Return in about 2 months (around 7/24/2022), or if symptoms worsen or fail to improve. Signs and symptoms to watch for discussed, serious signs and symptoms reviewed. ER if any. Dona Valdovinos MD    Patients are advised to check with insurance company to ensure coverage and to fully understand benefits and cost prior to any testing. This note was created with the assistance of voice recognition software. Document was reviewed however may contain grammatical errors.

## 2022-07-08 ENCOUNTER — OFFICE VISIT (OUTPATIENT)
Dept: FAMILY MEDICINE CLINIC | Age: 52
End: 2022-07-08
Payer: COMMERCIAL

## 2022-07-08 VITALS
OXYGEN SATURATION: 99 % | DIASTOLIC BLOOD PRESSURE: 86 MMHG | SYSTOLIC BLOOD PRESSURE: 124 MMHG | WEIGHT: 200.2 LBS | BODY MASS INDEX: 31.36 KG/M2 | HEART RATE: 109 BPM | TEMPERATURE: 97.5 F

## 2022-07-08 DIAGNOSIS — R09.82 POSTNASAL DRIP: ICD-10-CM

## 2022-07-08 DIAGNOSIS — J01.90 ACUTE SINUSITIS, RECURRENCE NOT SPECIFIED, UNSPECIFIED LOCATION: Primary | ICD-10-CM

## 2022-07-08 DIAGNOSIS — R09.81 NASAL CONGESTION: ICD-10-CM

## 2022-07-08 DIAGNOSIS — H61.21 IMPACTED CERUMEN OF RIGHT EAR: ICD-10-CM

## 2022-07-08 LAB
Lab: NORMAL
PERFORMING INSTRUMENT: NORMAL
QC PASS/FAIL: NORMAL
SARS-COV-2, POC: NORMAL

## 2022-07-08 PROCEDURE — 99203 OFFICE O/P NEW LOW 30 MIN: CPT | Performed by: PHYSICIAN ASSISTANT

## 2022-07-08 PROCEDURE — 87426 SARSCOV CORONAVIRUS AG IA: CPT | Performed by: PHYSICIAN ASSISTANT

## 2022-07-08 PROCEDURE — 69210 REMOVE IMPACTED EAR WAX UNI: CPT | Performed by: PHYSICIAN ASSISTANT

## 2022-07-08 RX ORDER — AMOXICILLIN AND CLAVULANATE POTASSIUM 875; 125 MG/1; MG/1
1 TABLET, FILM COATED ORAL 2 TIMES DAILY
Qty: 20 TABLET | Refills: 0 | Status: SHIPPED | OUTPATIENT
Start: 2022-07-08 | End: 2022-07-18

## 2022-07-08 NOTE — PROGRESS NOTES
22  Eros Kiser : 1970 Sex: female  Age 47 y.o. Subjective:  Chief Complaint   Patient presents with    Otalgia     right ear    Sinusitis         HPI:   Eros Kiser , 47 y.o. female presents to express care for evaluation of right ear pain, sinus congestion, drainage, cough    HPI  65-year-old female presents to express care for evaluation of right ear pain, congestion, drainage, sinus pressure. Patient started with the symptoms on Tuesday. The patient states that she was not feeling well yesterday at work and had to leave work because it was very hot. She works at [employer]. The patient states that she had an episode of emesis. The patient states that she feels a little bit better today but still having the congestion, drainage, right ear pain. Denies any chest pain, shortness of breath. No fevers or chills. The patient is vaccinated with booster. ROS:   Unless otherwise stated in this report the patient's positive and negative responses for review of systems for constitutional, eyes, ENT, cardiovascular, respiratory, gastrointestinal, neurological, , musculoskeletal, and integument systems and related systems to the presenting problem are either stated in the history of present illness or were not pertinent or were negative for the symptoms and/or complaints related to the presenting medical problem. Positives and pertinent negatives as per HPI. All others reviewed and are negative. PMH:   History reviewed. No pertinent past medical history. History reviewed. No pertinent surgical history.     Family History   Problem Relation Age of Onset    Uterine Cancer Maternal Aunt        Medications:     Current Outpatient Medications:     amoxicillin-clavulanate (AUGMENTIN) 875-125 MG per tablet, Take 1 tablet by mouth 2 times daily for 10 days, Disp: 20 tablet, Rfl: 0    Semaglutide,0.25 or 0.5MG/DOS, 2 MG/1.5ML SOPN, Inject 0.5 mg into the skin once a week, Disp: 1 pen, Rfl: 3    amLODIPine (NORVASC) 10 MG tablet, Take 1 tablet by mouth daily, Disp: 30 tablet, Rfl: 6    hydroCHLOROthiazide (HYDRODIURIL) 12.5 MG tablet, Take 1 tablet by mouth daily, Disp: 30 tablet, Rfl: 6    lisinopril (PRINIVIL;ZESTRIL) 40 MG tablet, Take 1 tablet by mouth daily, Disp: 30 tablet, Rfl: 6    metFORMIN (GLUCOPHAGE) 1000 MG tablet, Take 1 tablet by mouth 2 times daily (with meals), Disp: 60 tablet, Rfl: 6    metoprolol succinate (TOPROL XL) 50 MG extended release tablet, Take 1 tablet by mouth 2 times daily, Disp: 60 tablet, Rfl: 6    simvastatin (ZOCOR) 20 MG tablet, Take 1 tablet by mouth daily, Disp: 30 tablet, Rfl: 6    glucose monitoring kit (FREESTYLE) monitoring kit, 1 kit by Does not apply route daily, Disp: 1 kit, Rfl: 0    Lancets MISC, 1 each by Does not apply route 2 times daily, Disp: 200 each, Rfl: 3    blood glucose monitor strips, Test 1-2times qd, Disp: 200 strip, Rfl: 1    Allergies: Allergies   Allergen Reactions    Potassium-Containing Compounds        Social History:     Social History     Tobacco Use    Smoking status: Former Smoker     Packs/day: 1.50     Years: 4.00     Pack years: 6.00     Types: Cigarettes     Quit date: 1991     Years since quittin.5    Smokeless tobacco: Never Used   Substance Use Topics    Alcohol use: Not on file    Drug use: Not on file       Patient lives at home. Physical Exam:     Vitals:    22 0804   BP: 124/86   Site: Right Upper Arm   Position: Sitting   Pulse: (!) 109   Temp: 97.5 °F (36.4 °C)   TempSrc: Temporal   SpO2: 99%   Weight: 200 lb 3.2 oz (90.8 kg)       Exam:  Physical Exam  Nurse's notes and vital signs reviewed. The patient is not hypoxic. ? General: Alert, no acute distress, patient resting comfortably Patient is not toxic or lethargic. Skin: Warm, intact, no pallor noted. There is no evidence of rash at this time.   Head: Normocephalic, atraumatic  Eye: Normal conjunctiva  Ears, Nose, Throat: Right tympanic membrane not able to be visualized because of cerumen, left tympanic membrane clear. No drainage or discharge noted. No pre- or post-auricular tenderness, erythema, or swelling noted. Nasal congestion, rhinorrhea  Posterior oropharynx shows erythema but no evidence of tonsillar hypertrophy, or exudate. the uvula is midline. No trismus or drooling is noted. Moist mucous membranes. Neck: No anterior/posterior lymphadenopathy noted. No erythema, no masses, no fluctuance or induration noted. No meningeal signs. Cardiovascular: Regular Rate and Rhythm  Respiratory: No acute distress, no rhonchi, wheezing or crackles noted. No stridor or retractions are noted. Neurological: A&O x4, normal speech  Psychiatric: Cooperative         Testing:     Results for orders placed or performed in visit on 07/08/22   POCT COVID-19, Antigen   Result Value Ref Range    SARS-COV-2, POC Not-Detected Not Detected    Lot Number [id number]     QC Pass/Fail pass     Performing Instrument BD Veritor            Medical Decision Making:     Vital signs reviewed    Past medical history reviewed. Allergies reviewed. Medications reviewed. Patient on arrival does not appear to be in any apparent distress or discomfort. The patient has been seen and evaluated. The patient does not appear to be toxic or lethargic. The patient had significant wax noted in the right external ear. I was able to remove a large portion of the wax with ear curette. Repeat evaluation does show clear tympanic membrane. The patient is feeling much better. The patient had a rapid COVID test performed that was negative. We will treat the patient with Augmentin. The patient will have COVID PCR test performed. We will still have the patient quarantine isolate till we get the formal results. The patient was educated on the proper dosage of motrin and tylenol and the appropriate intervals of each.  The patient is to increase fluid intake over the next several days. The patient is to use OTC decongestant as needed. The patient is to return to express care or go directly to the emergency department should any of the signs or symptoms worsen. The patient is to followup with primary care physician in 2-3 days for repeat evaluation. The patient has no other questions or concerns at this time the patient will be discharged home. Clinical Impression:   Trisha Winchester was seen today for otalgia and sinusitis. Diagnoses and all orders for this visit:    Acute sinusitis, recurrence not specified, unspecified location  -     POCT COVID-19, Antigen  -     COVID-19 Ambulatory    Postnasal drip    Nasal congestion    Impacted cerumen of right ear  -     37656 - OK REMOVE IMPACTED EAR WAX    Other orders  -     amoxicillin-clavulanate (AUGMENTIN) 875-125 MG per tablet; Take 1 tablet by mouth 2 times daily for 10 days        The patient is to call for any concerns or return if any of the signs or symptoms worsen. The patient is to follow-up with PCP in the next 2-3 days for repeat evaluation repeat assessment or go directly to the emergency department.      SIGNATURE: Danae Mckeon III, PA-C

## 2022-07-09 LAB — SARS-COV-2, PCR: NOT DETECTED

## 2022-07-19 ENCOUNTER — HOSPITAL ENCOUNTER (OUTPATIENT)
Dept: MAMMOGRAPHY | Age: 52
Discharge: HOME | End: 2022-07-21

## 2022-07-19 DIAGNOSIS — Z12.31 ENCOUNTER FOR SCREENING MAMMOGRAM FOR MALIGNANT NEOPLASM OF BREAST: ICD-10-CM

## 2022-07-26 ENCOUNTER — OFFICE VISIT (OUTPATIENT)
Dept: PRIMARY CARE CLINIC | Age: 52
End: 2022-07-26
Payer: COMMERCIAL

## 2022-07-26 VITALS
HEIGHT: 67 IN | SYSTOLIC BLOOD PRESSURE: 138 MMHG | RESPIRATION RATE: 20 BRPM | TEMPERATURE: 98 F | WEIGHT: 195 LBS | OXYGEN SATURATION: 99 % | DIASTOLIC BLOOD PRESSURE: 86 MMHG | HEART RATE: 89 BPM | BODY MASS INDEX: 30.61 KG/M2

## 2022-07-26 DIAGNOSIS — K76.9 LIVER DISEASE: ICD-10-CM

## 2022-07-26 DIAGNOSIS — G47.33 OSA (OBSTRUCTIVE SLEEP APNEA): ICD-10-CM

## 2022-07-26 DIAGNOSIS — Z00.00 HEALTH MAINTENANCE EXAMINATION: ICD-10-CM

## 2022-07-26 DIAGNOSIS — E78.2 MIXED HYPERLIPIDEMIA: ICD-10-CM

## 2022-07-26 DIAGNOSIS — I10 ESSENTIAL HYPERTENSION: ICD-10-CM

## 2022-07-26 DIAGNOSIS — H61.23 BILATERAL IMPACTED CERUMEN: ICD-10-CM

## 2022-07-26 DIAGNOSIS — D68.59 THROMBOPHILIA (HCC): ICD-10-CM

## 2022-07-26 DIAGNOSIS — R31.9 HEMATURIA, UNSPECIFIED TYPE: ICD-10-CM

## 2022-07-26 DIAGNOSIS — E11.65 TYPE 2 DIABETES MELLITUS WITH HYPERGLYCEMIA, WITHOUT LONG-TERM CURRENT USE OF INSULIN (HCC): Primary | ICD-10-CM

## 2022-07-26 PROCEDURE — 69209 REMOVE IMPACTED EAR WAX UNI: CPT | Performed by: FAMILY MEDICINE

## 2022-07-26 PROCEDURE — 3052F HG A1C>EQUAL 8.0%<EQUAL 9.0%: CPT | Performed by: FAMILY MEDICINE

## 2022-07-26 PROCEDURE — 99214 OFFICE O/P EST MOD 30 MIN: CPT | Performed by: FAMILY MEDICINE

## 2022-07-26 NOTE — PROGRESS NOTES
02/19/2020 08:53 AM    BUN 17 03/30/2022 01:03 PM    BUN 23 07/02/2021 12:00 AM    BUN 13 02/19/2020 08:53 AM    CREATININE 0.8 03/30/2022 01:03 PM    CREATININE 0.92 07/02/2021 12:00 AM    CREATININE 0.7 02/19/2020 08:53 AM    LABGLOM >60 03/30/2022 01:03 PM    LABGLOM 72 07/02/2021 12:00 AM    LABGLOM >60 02/19/2020 08:53 AM    GFRAA >60 03/30/2022 01:03 PM    GFRAA >60 02/19/2020 08:53 AM    CALCIUM 9.4 03/30/2022 01:03 PM    CALCIUM 9.6 07/02/2021 12:00 AM    CALCIUM 9.6 02/19/2020 08:53 AM    PROT 7.6 03/30/2022 01:03 PM    PROT 7.8 02/19/2020 08:53 AM    LABALBU 4.2 03/30/2022 01:03 PM    LABALBU 4.4 07/02/2021 12:00 AM    LABALBU 4.4 02/19/2020 08:53 AM    BILITOT 0.3 03/30/2022 01:03 PM    BILITOT 0.5 07/02/2021 12:00 AM    BILITOT 0.3 02/19/2020 08:53 AM    ALKPHOS 74 03/30/2022 01:03 PM    ALKPHOS 64 07/02/2021 12:00 AM    ALKPHOS 74 02/19/2020 08:53 AM    AST 19 03/30/2022 01:03 PM    AST 18 07/02/2021 12:00 AM    AST 19 02/19/2020 08:53 AM    ALT 16 03/30/2022 01:03 PM    ALT 21 07/02/2021 12:00 AM    ALT 26 02/19/2020 08:53 AM     A1C  Lab Results   Component Value Date/Time    LABA1C 8.7 03/30/2022 01:03 PM    LABA1C 7.5 07/02/2021 12:00 AM    LABA1C 10.2 02/19/2020 08:53 AM     TSH  Lab Results   Component Value Date/Time    TSH 2.470 03/30/2022 01:03 PM    TSH 2.95 07/08/2021 12:00 AM    TSH 2.210 02/19/2020 08:53 AM     FREET4  No results found for: [id number]  LIPID  Lab Results   Component Value Date/Time    CHOL 185 03/30/2022 01:03 PM    CHOL 198 07/02/2021 12:00 AM    CHOL 156 02/19/2020 08:53 AM    HDL 72 03/30/2022 01:03 PM    HDL 76 07/02/2021 12:00 AM    HDL 50 02/19/2020 08:53 AM    LDLCALC 87 03/30/2022 01:03 PM    LDLCALC 96 07/02/2021 12:00 AM    LDLCALC 71 02/19/2020 08:53 AM    TRIG 132 03/30/2022 01:03 PM    TRIG 154 07/02/2021 12:00 AM    TRIG 174 02/19/2020 08:53 AM    CHOLHDLRATIO 2.6 07/02/2021 12:00 AM     VITAMIN D  No results found for: VITD25  MAGNESIUM  No results found for: MG PHOS  No results found for: PHOS   NIDHI   No results found for: NIDHI  RHEUMATOID FACTOR  No results found for: RF  PSA  No results found for: PSA   HEPATITIS C  No results found for: HCVABI  HIV  No results found for: VXC8NCW, HIV1QT  UA  Lab Results   Component Value Date/Time    COLORU Yellow 03/30/2022 01:02 PM    COLORU Yellow 07/02/2021 12:00 AM    COLORU Yellow 02/19/2020 08:52 AM    CLARITYU CLOUDY 03/30/2022 01:02 PM    CLARITYU Clear 07/02/2021 12:00 AM    CLARITYU Clear 02/19/2020 08:52 AM    GLUCOSEU Negative 03/30/2022 01:02 PM    GLUCOSEU neg 07/02/2021 12:00 AM    GLUCOSEU Negative 02/19/2020 08:52 AM    BILIRUBINUR Negative 03/30/2022 01:02 PM    BILIRUBINUR Negative 07/02/2021 12:00 AM    BILIRUBINUR Negative 02/19/2020 08:52 AM    KETUA Negative 03/30/2022 01:02 PM    KETUA Negative 07/02/2021 12:00 AM    KETUA Negative 02/19/2020 08:52 AM    SPECGRAV >=1.030 03/30/2022 01:02 PM    SPECGRAV >=1.030 02/19/2020 08:52 AM    BLOODU SMALL 03/30/2022 01:02 PM    BLOODU Negative 07/02/2021 12:00 AM    BLOODU TRACE-INTACT 02/19/2020 08:52 AM    PHUR 5.5 03/30/2022 01:02 PM    PHUR 5.5 07/02/2021 12:00 AM    PHUR 5.0 02/19/2020 08:52 AM    PROTEINU Negative 03/30/2022 01:02 PM    PROTEINU Negative 07/02/2021 12:00 AM    PROTEINU Negative 02/19/2020 08:52 AM    UROBILINOGEN 0.2 03/30/2022 01:02 PM    UROBILINOGEN Normal 07/02/2021 12:00 AM    UROBILINOGEN 0.2 02/19/2020 08:52 AM    NITRU Negative 03/30/2022 01:02 PM    NITRU Negative 07/02/2021 12:00 AM    NITRU Negative 02/19/2020 08:52 AM    LEUKOCYTESUR TRACE 03/30/2022 01:02 PM    LEUKOCYTESUR Negative 07/02/2021 12:00 AM    LEUKOCYTESUR Negative 02/19/2020 08:52 AM     Urine Micro/Albumin Ratio  Lab Results   Component Value Date/Time    [facility]    MALBCR - 02/19/2020 08:52 AM       ROS:  Const: Denies chills, fever, malaise and sweats. Eyes: Denies discharge, pain, redness and visual disturbance.   ENMT: Ears plugged as above denies nasal or sinus symptoms other than stated  above. Denies mouth and tongue lesions and sore throat. CV: Denies chest discomfort, pain; diaphoresis, dizziness, edema, lightheadedness, orthopnea,  palpitations, syncope and near syncopal episode or any exertional symptoms  Resp: Denies cough, hemoptysis, pleuritic pain, SOB, sputum production and wheezing. GI: Denies abdominal pain, change in bowel habits, hematochezia, melena, nausea and vomiting. : Denies urinary symptoms including dysuria , urgency, frequency or hematuria. Musculo: Denies musculoskeletal symptoms. Skin: Denies bruising and rash.   Neuro: Denies headache, numbness, stiff neck, tingling and focal weakness slurred speech or facial  droop  Hema/Lymph: Denies bleeding/bruising tendency and enlarged lymph nodes        Current Outpatient Medications:     Semaglutide,0.25 or 0.5MG/DOS, 2 MG/1.5ML SOPN, Inject 0.5 mg into the skin once a week, Disp: 1 pen, Rfl: 3    amLODIPine (NORVASC) 10 MG tablet, Take 1 tablet by mouth daily, Disp: 30 tablet, Rfl: 6    hydroCHLOROthiazide (HYDRODIURIL) 12.5 MG tablet, Take 1 tablet by mouth daily, Disp: 30 tablet, Rfl: 6    lisinopril (PRINIVIL;ZESTRIL) 40 MG tablet, Take 1 tablet by mouth daily, Disp: 30 tablet, Rfl: 6    metFORMIN (GLUCOPHAGE) 1000 MG tablet, Take 1 tablet by mouth 2 times daily (with meals), Disp: 60 tablet, Rfl: 6    metoprolol succinate (TOPROL XL) 50 MG extended release tablet, Take 1 tablet by mouth 2 times daily, Disp: 60 tablet, Rfl: 6    simvastatin (ZOCOR) 20 MG tablet, Take 1 tablet by mouth daily, Disp: 30 tablet, Rfl: 6    glucose monitoring kit (FREESTYLE) monitoring kit, 1 kit by Does not apply route daily, Disp: 1 kit, Rfl: 0    Lancets MISC, 1 each by Does not apply route 2 times daily, Disp: 200 each, Rfl: 3    blood glucose monitor strips, Test 1-2times qd, Disp: 200 strip, Rfl: 1  Allergies   Allergen Reactions    Potassium-Containing Compounds        No past medical history on file.  No past surgical history on file. Family History   Problem Relation Age of Onset    Uterine Cancer Maternal Aunt      Social History     Tobacco Use    Smoking status: Former     Packs/day: 1.50     Years: 4.00     Pack years: 6.00     Types: Cigarettes     Quit date: 1991     Years since quittin.5    Smokeless tobacco: Never      Social History     Social History Narrative    PMH:    Problem List: Essential hypertension    Health Maintenance:    Mammogram - (2019)    Mammogram Screening - (2019)    Medical Problems:    Hypertension    [place] - CPAP    gestational DM, Type 2 Diabetes    Surgical Hx:     Section    Reviewed, no changes. FH:        Father:    . (Hx)    Mother:    . (Hx)Mom - DM, HTN    Dad - doesn't know    Reviewed, no changes. SH:    . 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 Alcohol: Denies alcohol    use. Reviewed, no changes. Vitals:    22 1400   BP: 138/86   Pulse: 89   Resp: 20   Temp: 98 °F (36.7 °C)   TempSrc: Temporal   SpO2: 99%   Weight: 195 lb (88.5 kg)   Height: 5' 7\" (1.702 m)      Wt Readings from Last 3 Encounters:   22 195 lb (88.5 kg)   22 200 lb 3.2 oz (90.8 kg)   22 205 lb (93 kg)          Exam:  Const: Appears comfortable. No signs of acute distress present. Head/Face: Atraumatic on inspection. Eyes: EOMI in both eyes. No discharge from the eyes. PERRL. Sclerae clear. ENMT: Severe bilateral cerumen impaction, normal ear canals after irrigation tympanic membranes: intact and translucent. External nose WNL. Nasal mucosa is clear. Oropharynx: No erythema or exudate. Posterior pharynx is normal.  Neck: Supple. Palpation reveals no adenopathy. No masses appreciated. No JVD. Carotids: no  bruits. Resp: Respirations are unlabored. Clear to auscultation.  No rales, rhonchi or wheezes appreciated  over the lungs bilaterally. CV: Rate is regular. Rhythm is regular. No gallop or rubs. No heart murmur appreciated. Extremities: No clubbing, cyanosis, or edema. No calf inflammation or tenderness. Abdomen: Bowel sounds are normoactive. Abdomen is soft, nontender, and nondistended. No  abdominal masses. No palpable hepatosplenomegaly. Lymph: No palpable or visible regional lymphadenopathy. Musculoskeletal: no acute joint inflammation. Skin: Dry and warm with no rash. Skin normal to inspection and palpation overall. Neuro: Alert and oriented. Affect: appropriate. Upper Extremities: 5/5 bilaterally. Lower Extremities:  5/5 bilaterally. Sensation intact to light touch. Reflexes: DTR's are symmetric and 2+ bilaterally. .  Cranial Nerves: Cranial nerves grossly intact. Assessment and Plan:   Diagnosis Orders   1. Type 2 diabetes mellitus with hyperglycemia, without long-term current use of insulin ([address]        2. Essential hypertension        3. Mixed hyperlipidemia        4. Thrombophilia ([address]        5. Liver disease        6. CHINO (obstructive sleep apnea)        7. Hematuria, unspecified type        8. Health maintenance examination        9. Bilateral impacted cerumen  AL REMOVAL IMPACTED CERUMEN IRRIGATION/LVG UNILAT            Essential hypertension  Says excellent at home. Counseled. The risks of hypertension and hypotension reviewed. Watch closely ambulatory. Hyper and hypotensive precautions and parameters reviewed and written as well as parameters on pulse, call if out of range, ER dangers numbers. Lifestyle modification reviewed. Tolerating therapy. Continue current therapy    Type 2 diabetes mellitus with hyperglycemia, without long-term current use of insulin ([address]  Awaiting blood work. Tolerating therapy . However Ozempic cost issue as above. She will check with her insurance about a waiver to be able to continue to get it locally with coupon.   Samples provided in the meantime. Not interested in seeing endocrinologist.  Lifestyle location emphasized. Hyper and hypoglycemic precautions reviewed. She's had some diabetic retinopathy needs to follow  closely with ophthamology. Watch ambulatory. If out of range, let us know. Stressed importance of daily foot examinations,  regular eye examinations etc. micro-and macrovascular complications reviewed . hyper and  hypoglycemic precautions reviewed at length. Previously on glipizide, she did stop this did not feel it was effective. Risk of glipizide reviewed include hypoglycemia. She is currently on Metformin with standard precaution including LA/diarrhea, and Januvia with standard precautions. She is not interested in starting [place], she feels she would have a high likelihood of yeast infection and other issues. If affordable, continue Ozempic 0.5 with standard precautions, denies personal or family history of thyroid CA/pancreatic issues. Bilateral cerumen impaction  After risk benefits reviewed, successfully irrigated and symptoms resolved. Thrombophilia ([address]  Counseled extensively. Differential reviewed, including serious etiologies. .  Await repeat. Mild, stable. Monitor    Mixed hyperlipidemia  lifestyle modification reviewed. risk of hyperlipidemia reviewed tolerating therapy. Stable. Dose-dependent benefit reviewed, various potency statins reviewed, defers change in therapy. Continue current therapy. Side effects instructions ADRs reviewed discontinue if any    CHINO (obstructive sleep apnea)  Compliant. Feels very well with it, asymptomatic. Uses at least 6 to 8 hours per night 7 nights per week. Liver disease  Counseled extensively. Differential reviewed, including serious etiologies. Likely fatty liver. Precautions reviewed. Lifestyle modification appropriate diet and weight loss reviewed. Risks of  even this   reviewed. Hepatitis C screen negative.   Repeat LFTs well as protecting against potentially harmful/life threatening disease. Patient and/or guardian verbalizes understanding and agrees with above counseling, assessment and plan. All questions answered. Signs and symptoms to watch for discussed, serious signs and symptoms reviewed. ER if any. Art Mathis MD    Patients are advised to check with insurance company to ensure coverage and to fully understand benefits and cost prior to any testing. This note was created with the assistance of voice recognition software. Document was reviewed however may contain grammatical errors.

## 2022-07-28 DIAGNOSIS — E78.2 MIXED HYPERLIPIDEMIA: ICD-10-CM

## 2022-07-28 DIAGNOSIS — E11.65 TYPE 2 DIABETES MELLITUS WITH HYPERGLYCEMIA, WITHOUT LONG-TERM CURRENT USE OF INSULIN (HCC): ICD-10-CM

## 2022-07-28 DIAGNOSIS — I10 ESSENTIAL HYPERTENSION: ICD-10-CM

## 2022-07-28 LAB
ALBUMIN SERPL-MCNC: 4.5 G/DL (ref 3.5–5.2)
ALP BLD-CCNC: 68 U/L (ref 35–104)
ALT SERPL-CCNC: 18 U/L (ref 0–32)
ANION GAP SERPL CALCULATED.3IONS-SCNC: 13 MMOL/L (ref 7–16)
AST SERPL-CCNC: 17 U/L (ref 0–31)
BACTERIA: ABNORMAL /HPF
BASOPHILS ABSOLUTE: 0.03 E9/L (ref 0–0.2)
BASOPHILS RELATIVE PERCENT: 0.5 % (ref 0–2)
BILIRUB SERPL-MCNC: 0.4 MG/DL (ref 0–1.2)
BILIRUBIN URINE: NEGATIVE
BLOOD, URINE: NEGATIVE
BUN BLDV-MCNC: 14 MG/DL (ref 6–20)
CALCIUM SERPL-MCNC: 9.3 MG/DL (ref 8.6–10.2)
CHLORIDE BLD-SCNC: 102 MMOL/L (ref 98–107)
CHOLESTEROL, TOTAL: 190 MG/DL (ref 0–199)
CLARITY: ABNORMAL
CO2: 23 MMOL/L (ref 22–29)
COLOR: YELLOW
CREAT SERPL-MCNC: 0.7 MG/DL (ref 0.5–1)
CREATININE URINE: 206 MG/DL (ref 29–226)
EOSINOPHILS ABSOLUTE: 0.38 E9/L (ref 0.05–0.5)
EOSINOPHILS RELATIVE PERCENT: 6 % (ref 0–6)
GFR AFRICAN AMERICAN: >60
GFR NON-AFRICAN AMERICAN: >60 ML/MIN/1.73
GLUCOSE BLD-MCNC: 176 MG/DL (ref 74–99)
GLUCOSE URINE: 500 MG/DL
HBA1C MFR BLD: 8.4 % (ref 4–5.6)
HCT VFR BLD CALC: 37.6 % (ref 34–48)
HDLC SERPL-MCNC: 67 MG/DL
HEMOGLOBIN: 12.3 G/DL (ref 11.5–15.5)
IMMATURE GRANULOCYTES #: 0.02 E9/L
IMMATURE GRANULOCYTES %: 0.3 % (ref 0–5)
KETONES, URINE: NEGATIVE MG/DL
LDL CHOLESTEROL CALCULATED: 102 MG/DL (ref 0–99)
LEUKOCYTE ESTERASE, URINE: NEGATIVE
LYMPHOCYTES ABSOLUTE: 1.74 E9/L (ref 1.5–4)
LYMPHOCYTES RELATIVE PERCENT: 27.7 % (ref 20–42)
MCH RBC QN AUTO: 29.5 PG (ref 26–35)
MCHC RBC AUTO-ENTMCNC: 32.7 % (ref 32–34.5)
MCV RBC AUTO: 90.2 FL (ref 80–99.9)
MICROALBUMIN UR-MCNC: <12 MG/L
MICROALBUMIN/CREAT UR-RTO: ABNORMAL (ref 0–30)
MONOCYTES ABSOLUTE: 0.49 E9/L (ref 0.1–0.95)
MONOCYTES RELATIVE PERCENT: 7.8 % (ref 2–12)
NEUTROPHILS ABSOLUTE: 3.63 E9/L (ref 1.8–7.3)
NEUTROPHILS RELATIVE PERCENT: 57.7 % (ref 43–80)
NITRITE, URINE: NEGATIVE
PDW BLD-RTO: 13.5 FL (ref 11.5–15)
PH UA: 5.5 (ref 5–9)
PLATELET # BLD: 420 E9/L (ref 130–450)
PMV BLD AUTO: 9.9 FL (ref 7–12)
POTASSIUM SERPL-SCNC: 4.8 MMOL/L (ref 3.5–5)
PROTEIN UA: NEGATIVE MG/DL
RBC # BLD: 4.17 E12/L (ref 3.5–5.5)
RBC UA: ABNORMAL /HPF (ref 0–2)
SODIUM BLD-SCNC: 138 MMOL/L (ref 132–146)
SPECIFIC GRAVITY UA: 1.02 (ref 1–1.03)
TOTAL CK: 160 U/L (ref 20–180)
TOTAL PROTEIN: 7.8 G/DL (ref 6.4–8.3)
TRIGL SERPL-MCNC: 103 MG/DL (ref 0–149)
TSH SERPL DL<=0.05 MIU/L-ACNC: 1.77 UIU/ML (ref 0.27–4.2)
UROBILINOGEN, URINE: 0.2 E.U./DL
VLDLC SERPL CALC-MCNC: 21 MG/DL
WBC # BLD: 6.3 E9/L (ref 4.5–11.5)
WBC UA: ABNORMAL /HPF (ref 0–5)

## 2022-08-17 ENCOUNTER — OFFICE VISIT (OUTPATIENT)
Dept: PRIMARY CARE CLINIC | Age: 52
End: 2022-08-17
Payer: COMMERCIAL

## 2022-08-17 VITALS
WEIGHT: 195 LBS | HEART RATE: 97 BPM | OXYGEN SATURATION: 98 % | SYSTOLIC BLOOD PRESSURE: 132 MMHG | BODY MASS INDEX: 30.54 KG/M2 | DIASTOLIC BLOOD PRESSURE: 80 MMHG | TEMPERATURE: 97.8 F

## 2022-08-17 DIAGNOSIS — E78.2 MIXED HYPERLIPIDEMIA: ICD-10-CM

## 2022-08-17 DIAGNOSIS — Z00.00 HEALTH MAINTENANCE EXAMINATION: ICD-10-CM

## 2022-08-17 DIAGNOSIS — G47.33 OSA (OBSTRUCTIVE SLEEP APNEA): ICD-10-CM

## 2022-08-17 DIAGNOSIS — R31.9 HEMATURIA, UNSPECIFIED TYPE: ICD-10-CM

## 2022-08-17 DIAGNOSIS — D68.59 THROMBOPHILIA (HCC): ICD-10-CM

## 2022-08-17 DIAGNOSIS — K76.9 LIVER DISEASE: ICD-10-CM

## 2022-08-17 DIAGNOSIS — E11.65 TYPE 2 DIABETES MELLITUS WITH HYPERGLYCEMIA, WITHOUT LONG-TERM CURRENT USE OF INSULIN (HCC): Primary | ICD-10-CM

## 2022-08-17 DIAGNOSIS — I10 ESSENTIAL HYPERTENSION: ICD-10-CM

## 2022-08-17 PROCEDURE — 3052F HG A1C>EQUAL 8.0%<EQUAL 9.0%: CPT | Performed by: FAMILY MEDICINE

## 2022-08-17 PROCEDURE — 99214 OFFICE O/P EST MOD 30 MIN: CPT | Performed by: FAMILY MEDICINE

## 2022-08-17 RX ORDER — GLIPIZIDE 5 MG/1
5 TABLET ORAL
Qty: 60 TABLET | Refills: 3 | Status: SHIPPED | OUTPATIENT
Start: 2022-08-17

## 2022-08-17 ASSESSMENT — PATIENT HEALTH QUESTIONNAIRE - PHQ9
1. LITTLE INTEREST OR PLEASURE IN DOING THINGS: 0
SUM OF ALL RESPONSES TO PHQ QUESTIONS 1-9: 0
2. FEELING DOWN, DEPRESSED OR HOPELESS: 0
SUM OF ALL RESPONSES TO PHQ QUESTIONS 1-9: 0
SUM OF ALL RESPONSES TO PHQ9 QUESTIONS 1 & 2: 0

## 2022-08-17 NOTE — PROGRESS NOTES
Naya Bourgeois : 1970 Sex: female  Age: 47 y.o. Chief Complaint   Patient presents with    Discuss Labs       HPI:    Patient presents today for follow-up. She is tolerating medicines well. Asking for a another sample for Ozempic as her insurance changes with a new job in a couple weeks. No symptoms complaints or concerns.   Likes that she is losing weight on Ozempic, purposeful    Tolerating meds well    Blood work reviewed, unfortunately glucose 176 with a hemoglobin A1c that is elevated but dropped to 8.4 LFTs normal CMP otherwise normal    HDL 67 triglyceride 100 and 3, she is on simvastatin, defers change in therapy of this, TSH normal CBC with differential normal urinalysis reviewed no blood microalbumin creatinine ratio-and see          Most Recent Labs  CBC  Lab Results   Component Value Date/Time    WBC 6.3 2022 08:01 AM    WBC 9.8 2022 01:03 PM    WBC 8.4 2021 12:00 AM    RBC 4.17 2022 08:01 AM    RBC 4.19 2022 01:03 PM    RBC 4.12 2021 12:00 AM    HGB 12.3 2022 08:01 AM    HGB 12.4 2022 01:03 PM    HGB 12.4 2021 12:00 AM    HCT 37.6 2022 08:01 AM    HCT 37.7 2022 01:03 PM    HCT 37.0 2021 12:00 AM    MCV 90.2 2022 08:01 AM    MCV 90.0 2022 01:03 PM    MCV 89.8 2021 12:00 AM     2022 08:01 AM     2022 01:03 PM     2021 12:00 AM      CMP  Lab Results   Component Value Date/Time     2022 08:01 AM     2022 01:03 PM     2021 12:00 AM    K 4.8 2022 08:01 AM    K 3.9 2022 01:03 PM    K 3.8 2021 12:00 AM     2022 08:01 AM    CL 98 2022 01:03 PM    CL 99 2021 12:00 AM    CO2 23 2022 08:01 AM    CO2 20 2022 01:03 PM    CO2 28 2021 12:00 AM    ANIONGAP 13 2022 08:01 AM    ANIONGAP 19 2022 01:03 PM    ANIONGAP 1.6 2021 12:00 AM    GLUCOSE 176 2022 08:01 AM    GLUCOSE 143 03/30/2022 01:03 PM    GLUCOSE 128 07/02/2021 12:00 AM    BUN 14 07/28/2022 08:01 AM    BUN 17 03/30/2022 01:03 PM    BUN 23 07/02/2021 12:00 AM    CREATININE 0.7 07/28/2022 08:01 AM    CREATININE 0.8 03/30/2022 01:03 PM    CREATININE 0.92 07/02/2021 12:00 AM    LABGLOM >60 07/28/2022 08:01 AM    LABGLOM >60 03/30/2022 01:03 PM    LABGLOM 72 07/02/2021 12:00 AM    LABGLOM >60 02/19/2020 08:53 AM    GFRAA >60 07/28/2022 08:01 AM    GFRAA >60 03/30/2022 01:03 PM    GFRAA >60 02/19/2020 08:53 AM    CALCIUM 9.3 07/28/2022 08:01 AM    CALCIUM 9.4 03/30/2022 01:03 PM    CALCIUM 9.6 07/02/2021 12:00 AM    PROT 7.8 07/28/2022 08:01 AM    PROT 7.6 03/30/2022 01:03 PM    PROT 7.8 02/19/2020 08:53 AM    LABALBU 4.5 07/28/2022 08:01 AM    LABALBU 4.2 03/30/2022 01:03 PM    LABALBU 4.4 07/02/2021 12:00 AM    BILITOT 0.4 07/28/2022 08:01 AM    BILITOT 0.3 03/30/2022 01:03 PM    BILITOT 0.5 07/02/2021 12:00 AM    ALKPHOS 68 07/28/2022 08:01 AM    ALKPHOS 74 03/30/2022 01:03 PM    ALKPHOS 64 07/02/2021 12:00 AM    AST 17 07/28/2022 08:01 AM    AST 19 03/30/2022 01:03 PM    AST 18 07/02/2021 12:00 AM    ALT 18 07/28/2022 08:01 AM    ALT 16 03/30/2022 01:03 PM    ALT 21 07/02/2021 12:00 AM     A1C  Lab Results   Component Value Date/Time    LABA1C 8.4 07/28/2022 08:01 AM    LABA1C 8.7 03/30/2022 01:03 PM    LABA1C 7.5 07/02/2021 12:00 AM     TSH  Lab Results   Component Value Date/Time    TSH 1.770 07/28/2022 08:01 AM    TSH 2.470 03/30/2022 01:03 PM    TSH 2.95 07/08/2021 12:00 AM     FREET4  No results found for: [id number]  LIPID  Lab Results   Component Value Date/Time    CHOL 190 07/28/2022 08:01 AM    CHOL 185 03/30/2022 01:03 PM    CHOL 198 07/02/2021 12:00 AM    HDL 67 07/28/2022 08:01 AM    HDL 72 03/30/2022 01:03 PM    HDL 76 07/02/2021 12:00 AM    LDLCALC 102 07/28/2022 08:01 AM    LDLCALC 87 03/30/2022 01:03 PM    LDLCALC 96 07/02/2021 12:00 AM    TRIG 103 07/28/2022 08:01 AM    TRIG 132 03/30/2022 01:03 PM    TRIG 154 07/02/2021 12:00 AM    CHOLHDLRATIO 2.6 07/02/2021 12:00 AM     VITAMIN D  No results found for: VITD25  MAGNESIUM  No results found for: MG   PHOS  No results found for: PHOS   NIDHI   No results found for: NIDHI  RHEUMATOID FACTOR  No results found for: RF  PSA  No results found for: PSA   HEPATITIS C  No results found for: HCVABI  HIV  No results found for: FTI1ENN, HIV1QT  UA  Lab Results   Component Value Date/Time    COLORU Yellow 07/28/2022 08:01 AM    COLORU Yellow 03/30/2022 01:02 PM    COLORU Yellow 07/02/2021 12:00 AM    CLARITYU SL CLOUDY 07/28/2022 08:01 AM    CLARITYU CLOUDY 03/30/2022 01:02 PM    CLARITYU Clear 07/02/2021 12:00 AM    GLUCOSEU 500 07/28/2022 08:01 AM    GLUCOSEU Negative 03/30/2022 01:02 PM    GLUCOSEU neg 07/02/2021 12:00 AM    BILIRUBINUR Negative 07/28/2022 08:01 AM    BILIRUBINUR Negative 03/30/2022 01:02 PM    BILIRUBINUR Negative 07/02/2021 12:00 AM    BILIRUBINUR Negative 02/19/2020 08:52 AM    KETUA Negative 07/28/2022 08:01 AM    KETUA Negative 03/30/2022 01:02 PM    KETUA Negative 07/02/2021 12:00 AM    SPECGRAV 1.025 07/28/2022 08:01 AM    SPECGRAV >=1.030 03/30/2022 01:02 PM    SPECGRAV >=1.030 02/19/2020 08:52 AM    BLOODU Negative 07/28/2022 08:01 AM    BLOODU SMALL 03/30/2022 01:02 PM    BLOODU Negative 07/02/2021 12:00 AM    PHUR 5.5 07/28/2022 08:01 AM    PHUR 5.5 03/30/2022 01:02 PM    PHUR 5.5 07/02/2021 12:00 AM    PROTEINU Negative 07/28/2022 08:01 AM    PROTEINU Negative 03/30/2022 01:02 PM    PROTEINU Negative 07/02/2021 12:00 AM    UROBILINOGEN 0.2 07/28/2022 08:01 AM    UROBILINOGEN 0.2 03/30/2022 01:02 PM    UROBILINOGEN Normal 07/02/2021 12:00 AM    NITRU Negative 07/28/2022 08:01 AM    NITRU Negative 03/30/2022 01:02 PM    NITRU Negative 07/02/2021 12:00 AM    LEUKOCYTESUR Negative 07/28/2022 08:01 AM    LEUKOCYTESUR TRACE 03/30/2022 01:02 PM    LEUKOCYTESUR Negative 07/02/2021 12:00 AM     Urine Micro/Albumin Ratio  Lab Results   Component 30 tablet, Rfl: 6    glucose monitoring kit (FREESTYLE) monitoring kit, 1 kit by Does not apply route daily, Disp: 1 kit, Rfl: 0    Lancets MISC, 1 each by Does not apply route 2 times daily, Disp: 200 each, Rfl: 3    blood glucose monitor strips, Test 1-2times qd, Disp: 200 strip, Rfl: 1  Allergies   Allergen Reactions    Potassium-Containing Compounds        No past medical history on file. No past surgical history on file. Family History   Problem Relation Age of Onset    Uterine Cancer Maternal Aunt      Social History     Tobacco Use    Smoking status: Former     Packs/day: 1.50     Years: 4.00     Pack years: 6.00     Types: Cigarettes     Quit date: 1991     Years since quittin.6    Smokeless tobacco: Never      Social History     Social History Narrative    PMH:    Problem List: Essential hypertension    Health Maintenance:    Mammogram - (2019)    Mammogram Screening - (2019)    Medical Problems:    Hypertension    [place] - CPAP    gestational DM, Type 2 Diabetes    Surgical Hx:     Section    Reviewed, no changes. FH:        Father:    . (Hx)    Mother:    . (Hx)Mom - DM, HTN    Dad - doesn't know    Reviewed, no changes. SH:    . 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 Alcohol: Denies alcohol    use. Reviewed, no changes. Vitals:    22 1401   BP: 132/80   Pulse: 97   Temp: 97.8 °F (36.6 °C)   SpO2: 98%   Weight: 195 lb (88.5 kg)      Wt Readings from Last 3 Encounters:   22 195 lb (88.5 kg)   22 195 lb (88.5 kg)   22 200 lb 3.2 oz (90.8 kg)          Exam:  Const: Appears comfortable. No signs of acute distress present. Head/Face: Atraumatic on inspection. Eyes: EOMI in both eyes. No discharge from the eyes. PERRL. Sclerae clear.   ENMT: Ears clear, tympanic membranes: intact and translucent. External nose WNL. Nasal mucosa is clear. Oropharynx: No erythema or exudate. Posterior pharynx is normal.  Neck: Supple. Palpation reveals no adenopathy. No masses appreciated. No JVD. Carotids: no  bruits. Resp: Respirations are unlabored. Clear to auscultation. No rales, rhonchi or wheezes appreciated  over the lungs bilaterally. CV: Rate is regular. Rhythm is regular. No gallop or rubs. No heart murmur appreciated. Extremities: No clubbing, cyanosis, or edema. No calf inflammation or tenderness. Abdomen: Bowel sounds are normoactive. Abdomen is soft, nontender, and nondistended. No  abdominal masses. No palpable hepatosplenomegaly. Lymph: No palpable or visible regional lymphadenopathy. Musculoskeletal: no acute joint inflammation. Skin: Dry and warm with no rash. Skin normal to inspection and palpation overall. Neuro: Alert and oriented. Affect: appropriate. Upper Extremities: 5/5 bilaterally. Lower Extremities:  5/5 bilaterally. Sensation intact to light touch. Reflexes: DTR's are symmetric and 2+ bilaterally. .  Cranial Nerves: Cranial nerves grossly intact. Assessment and Plan:   Diagnosis Orders   1. Type 2 diabetes mellitus with hyperglycemia, without long-term current use of insulin (HCC)  Hemoglobin A1C    Urinalysis    Microalbumin / Creatinine Urine Ratio    TSH    glipiZIDE (GLUCOTROL) 5 MG tablet      2. Essential hypertension  TSH      3. Mixed hyperlipidemia  CK    Comprehensive Metabolic Panel    Lipid Panel    TSH      4. Liver disease        5. Thrombophilia (HCC)  CBC with Auto Differential      6. CHINO (obstructive sleep apnea)        7. Hematuria, unspecified type        8. Health maintenance examination                Essential hypertension  Says excellent at home. Counseled. The risks of hypertension and hypotension reviewed. Watch closely ambulatory.  Hyper and hypotensive precautions and parameters reviewed and written as well as parameters on pulse, call if out of range, ER dangers numbers. Lifestyle modification reviewed. Tolerating therapy. Continue current therapy    Type 2 diabetes mellitus with hyperglycemia, without long-term current use of insulin ([facility] [address]  Not at goal.  Tolerating therapy . In regard to [employer] changing soon, samples provided in the meantime. Not interested in seeing endocrinologist.  Lifestyle location emphasized. Hyper and hypoglycemic precautions reviewed. She's had some diabetic retinopathy needs to follow  closely with ophthamology. Watch ambulatory. If out of range, let us know. Stressed importance of daily foot examinations,  regular eye examinations etc. micro-and macrovascular complications reviewed . hyper and  hypoglycemic precautions reviewed at length. Previously on glipizide, she did stop this did not feel it was effective. Risk of glipizide reviewed include hypoglycemia. She is currently on Metformin with standard precaution including LA/diarrhea, and Januvia with standard precautions. She is not interested in starting [place], she feels she would have a high likelihood of yeast infection and other issues. This time she is going to continue Ozempic 0.5 with standard precautions, denies personal or family history of thyroid CA/pancreatic issues. She is agreeable to adding glipizide once a day with meals and depend on sugars may titrate to twice a day with meals with precaution clued hypoglycemia. Simply wants recheck 3 months sooner as needed      Thrombophilia ([facility] [address]  Counseled extensively. Differential reviewed, including serious etiologies. .  It was mild, normalized. Asymptomatic. Monitor      Mixed hyperlipidemia  lifestyle modification reviewed. risk of hyperlipidemia reviewed tolerating therapy. Goals reviewed,  Dose-dependent benefit reviewed, various potency statins reviewed, defers change in therapy. Continue current therapy.   Side effects instructions ADRs reviewed discontinue if any    CHINO (obstructive sleep apnea)  Compliant. Feels very well with it, asymptomatic. Uses at least 6 to 8 hours per night 7 nights per week. Liver disease  Counseled extensively. Differential reviewed, including serious etiologies. Likely fatty liver. Precautions reviewed. Lifestyle modification appropriate diet and weight loss reviewed. Risks of  even this   reviewed. Hepatitis C screen negative. Repeat LFTs normal    Health maintenance examination  Counseled at length  7/21. Encourage yearly. Declines colon ca screening/fit test. mammo ordered    Hematuria  Repeat normalized      No flowsheet data found. Plan as above. Counseled extensively and differential diagnoses relevant to above were reviewed, including serious etiologies, risks and complications, especially of left uncontrolled. If relevant, instructions and  alternatives to meds/treatment reviewed, as well as interactions, and  SE's/ADRs reviewed, notify immediately if any, discontinuing new meds if any. Plan made after discussion and shared decision making. In very brief summary, samples of Ozempic provided, start glipizide, otherwise symptoms blood work and follow-up in 3 months sooner as needed. Insurance and job changing seen        As long as symptoms steadily improve/resolve, and medical conditions follow the expected course, FU as below, sooner PRN. Return in about 3 months (around 11/17/2022), or if symptoms worsen or fail to improve. Educational materials and/or home exercises printed for patient's review and were included in patient instructions on his/her After Visit Summary and given to patient at the end of visit. After discussion, patient and/or guardian verbalizes understanding, agrees, feels comfortable with and wishes to proceed with above treatment plan. Call for any pending results, FU sooner if abnormal, as needed or if any current symptoms persist/worsen.       Advised patient to call with any new medication issues, and read all Rx info from pharmacy to assure aware of all possible risks and side effects of medication before taking. Reviewed age and gender appropriate health screening exams and vaccinations. Advised patient regarding importance of keeping up with recommended health maintenance and to schedule as soon as possible if overdue, as this is important in assessing for undiagnosed pathology, especially cancer, as well as protecting against potentially harmful/life threatening disease. Patient and/or guardian verbalizes understanding and agrees with above counseling, assessment and plan. All questions answered. Signs and symptoms to watch for discussed, serious signs and symptoms reviewed. ER if any. Daija Marrero MD    Patients are advised to check with insurance company to ensure coverage and to fully understand benefits and cost prior to any testing. This note was created with the assistance of voice recognition software. Document was reviewed however may contain grammatical errors.

## 2022-09-27 DIAGNOSIS — E11.65 TYPE 2 DIABETES MELLITUS WITH HYPERGLYCEMIA, WITHOUT LONG-TERM CURRENT USE OF INSULIN (HCC): ICD-10-CM

## 2022-09-27 DIAGNOSIS — I10 ESSENTIAL HYPERTENSION: ICD-10-CM

## 2022-09-27 DIAGNOSIS — E78.2 MIXED HYPERLIPIDEMIA: ICD-10-CM

## 2022-09-27 RX ORDER — LISINOPRIL 40 MG/1
40 TABLET ORAL DAILY
Qty: 30 TABLET | Refills: 6 | Status: SHIPPED | OUTPATIENT
Start: 2022-09-27

## 2022-09-27 RX ORDER — HYDROCHLOROTHIAZIDE 12.5 MG/1
12.5 TABLET ORAL DAILY
Qty: 30 TABLET | Refills: 6 | Status: SHIPPED | OUTPATIENT
Start: 2022-09-27

## 2022-09-27 RX ORDER — SIMVASTATIN 20 MG
20 TABLET ORAL DAILY
Qty: 30 TABLET | Refills: 6 | Status: SHIPPED | OUTPATIENT
Start: 2022-09-27

## 2022-09-27 RX ORDER — AMLODIPINE BESYLATE 10 MG/1
10 TABLET ORAL DAILY
Qty: 30 TABLET | Refills: 6 | Status: SHIPPED | OUTPATIENT
Start: 2022-09-27

## 2022-09-27 RX ORDER — METOPROLOL SUCCINATE 50 MG/1
50 TABLET, EXTENDED RELEASE ORAL 2 TIMES DAILY
Qty: 60 TABLET | Refills: 6 | Status: SHIPPED | OUTPATIENT
Start: 2022-09-27

## 2022-11-11 ENCOUNTER — HOSPITAL ENCOUNTER (OUTPATIENT)
Dept: MAMMOGRAPHY | Age: 52
Discharge: HOME | End: 2022-11-13
Payer: COMMERCIAL

## 2022-11-11 VITALS — WEIGHT: 190 LBS | BODY MASS INDEX: 29.82 KG/M2 | HEIGHT: 67 IN

## 2022-11-11 PROCEDURE — 77063 BREAST TOMOSYNTHESIS BI: CPT

## 2023-03-10 ENCOUNTER — OFFICE VISIT (OUTPATIENT)
Dept: PRIMARY CARE CLINIC | Age: 53
End: 2023-03-10

## 2023-03-10 VITALS
SYSTOLIC BLOOD PRESSURE: 136 MMHG | HEART RATE: 80 BPM | BODY MASS INDEX: 32.89 KG/M2 | TEMPERATURE: 98 F | WEIGHT: 210 LBS | OXYGEN SATURATION: 98 % | DIASTOLIC BLOOD PRESSURE: 88 MMHG

## 2023-03-10 DIAGNOSIS — I10 ESSENTIAL HYPERTENSION: Primary | ICD-10-CM

## 2023-03-10 DIAGNOSIS — E78.2 MIXED HYPERLIPIDEMIA: ICD-10-CM

## 2023-03-10 DIAGNOSIS — D68.59 THROMBOPHILIA (HCC): ICD-10-CM

## 2023-03-10 DIAGNOSIS — Z12.11 COLON CANCER SCREENING: ICD-10-CM

## 2023-03-10 DIAGNOSIS — E11.65 TYPE 2 DIABETES MELLITUS WITH HYPERGLYCEMIA, WITHOUT LONG-TERM CURRENT USE OF INSULIN (HCC): ICD-10-CM

## 2023-03-10 DIAGNOSIS — K76.9 LIVER DISEASE: ICD-10-CM

## 2023-03-10 DIAGNOSIS — Z00.00 HEALTH MAINTENANCE EXAMINATION: ICD-10-CM

## 2023-03-10 DIAGNOSIS — R31.9 HEMATURIA, UNSPECIFIED TYPE: ICD-10-CM

## 2023-03-10 DIAGNOSIS — G47.33 OSA (OBSTRUCTIVE SLEEP APNEA): ICD-10-CM

## 2023-03-10 DIAGNOSIS — I10 ESSENTIAL HYPERTENSION: ICD-10-CM

## 2023-03-10 LAB
ALBUMIN SERPL-MCNC: 4.1 G/DL (ref 3.5–5.2)
ALP BLD-CCNC: 70 U/L (ref 35–104)
ALT SERPL-CCNC: 22 U/L (ref 0–32)
ANION GAP SERPL CALCULATED.3IONS-SCNC: 16 MMOL/L (ref 7–16)
AST SERPL-CCNC: 27 U/L (ref 0–31)
BACTERIA: NORMAL /HPF
BASOPHILS ABSOLUTE: 0.07 E9/L (ref 0–0.2)
BASOPHILS RELATIVE PERCENT: 0.9 % (ref 0–2)
BILIRUB SERPL-MCNC: 0.4 MG/DL (ref 0–1.2)
BILIRUBIN URINE: NEGATIVE
BLOOD, URINE: NORMAL
BUN BLDV-MCNC: 14 MG/DL (ref 6–20)
CALCIUM SERPL-MCNC: 9.2 MG/DL (ref 8.6–10.2)
CHLORIDE BLD-SCNC: 98 MMOL/L (ref 98–107)
CHOLESTEROL, TOTAL: 206 MG/DL (ref 0–199)
CLARITY: CLEAR
CO2: 21 MMOL/L (ref 22–29)
COLOR: YELLOW
CREAT SERPL-MCNC: 0.6 MG/DL (ref 0.5–1)
CREATININE URINE: 63 MG/DL (ref 29–226)
EOSINOPHILS ABSOLUTE: 0.29 E9/L (ref 0.05–0.5)
EOSINOPHILS RELATIVE PERCENT: 3.5 % (ref 0–6)
EPITHELIAL CELLS, UA: NORMAL /HPF
GFR SERPL CREATININE-BSD FRML MDRD: >60 ML/MIN/1.73
GLUCOSE BLD-MCNC: 117 MG/DL (ref 74–99)
GLUCOSE URINE: NEGATIVE MG/DL
HBA1C MFR BLD: 6.9 % (ref 4–5.6)
HCT VFR BLD CALC: 38.9 % (ref 34–48)
HDLC SERPL-MCNC: 73 MG/DL
HEMOGLOBIN: 12.8 G/DL (ref 11.5–15.5)
IMMATURE GRANULOCYTES #: 0.03 E9/L
IMMATURE GRANULOCYTES %: 0.4 % (ref 0–5)
KETONES, URINE: NEGATIVE MG/DL
LDL CHOLESTEROL CALCULATED: 105 MG/DL (ref 0–99)
LEUKOCYTE ESTERASE, URINE: NEGATIVE
LYMPHOCYTES ABSOLUTE: 1.78 E9/L (ref 1.5–4)
LYMPHOCYTES RELATIVE PERCENT: 21.7 % (ref 20–42)
MCH RBC QN AUTO: 29.2 PG (ref 26–35)
MCHC RBC AUTO-ENTMCNC: 32.9 % (ref 32–34.5)
MCV RBC AUTO: 88.8 FL (ref 80–99.9)
MICROALBUMIN UR-MCNC: <12 MG/L
MICROALBUMIN/CREAT UR-RTO: ABNORMAL (ref 0–30)
MONOCYTES ABSOLUTE: 0.62 E9/L (ref 0.1–0.95)
MONOCYTES RELATIVE PERCENT: 7.6 % (ref 2–12)
NEUTROPHILS ABSOLUTE: 5.41 E9/L (ref 1.8–7.3)
NEUTROPHILS RELATIVE PERCENT: 65.9 % (ref 43–80)
NITRITE, URINE: NEGATIVE
PDW BLD-RTO: 13.2 FL (ref 11.5–15)
PH UA: 5.5 (ref 5–9)
PLATELET # BLD: 441 E9/L (ref 130–450)
PMV BLD AUTO: 9.7 FL (ref 7–12)
POTASSIUM SERPL-SCNC: 4.2 MMOL/L (ref 3.5–5)
PROTEIN UA: NEGATIVE MG/DL
RBC # BLD: 4.38 E12/L (ref 3.5–5.5)
RBC UA: NORMAL /HPF (ref 0–2)
SODIUM BLD-SCNC: 135 MMOL/L (ref 132–146)
SPECIFIC GRAVITY UA: 1.02 (ref 1–1.03)
TOTAL CK: 142 U/L (ref 20–180)
TOTAL PROTEIN: 7.5 G/DL (ref 6.4–8.3)
TRIGL SERPL-MCNC: 138 MG/DL (ref 0–149)
TSH SERPL DL<=0.05 MIU/L-ACNC: 2.25 UIU/ML (ref 0.27–4.2)
UROBILINOGEN, URINE: 0.2 E.U./DL
VLDLC SERPL CALC-MCNC: 28 MG/DL
WBC # BLD: 8.2 E9/L (ref 4.5–11.5)
WBC UA: NORMAL /HPF (ref 0–5)

## 2023-03-10 RX ORDER — METOPROLOL SUCCINATE 50 MG/1
50 TABLET, EXTENDED RELEASE ORAL 2 TIMES DAILY
Qty: 60 TABLET | Refills: 6 | Status: SHIPPED | OUTPATIENT
Start: 2023-03-10

## 2023-03-10 RX ORDER — AMLODIPINE BESYLATE 10 MG/1
10 TABLET ORAL DAILY
Qty: 30 TABLET | Refills: 6 | Status: SHIPPED | OUTPATIENT
Start: 2023-03-10

## 2023-03-10 RX ORDER — HYDROCHLOROTHIAZIDE 12.5 MG/1
12.5 TABLET ORAL DAILY
Qty: 30 TABLET | Refills: 6 | Status: SHIPPED | OUTPATIENT
Start: 2023-03-10

## 2023-03-10 RX ORDER — SIMVASTATIN 20 MG
20 TABLET ORAL DAILY
Qty: 30 TABLET | Refills: 6 | Status: SHIPPED | OUTPATIENT
Start: 2023-03-10

## 2023-03-10 RX ORDER — LISINOPRIL 40 MG/1
40 TABLET ORAL DAILY
Qty: 30 TABLET | Refills: 6 | Status: SHIPPED | OUTPATIENT
Start: 2023-03-10

## 2023-03-10 RX ORDER — GLIPIZIDE 5 MG/1
5 TABLET ORAL
Qty: 60 TABLET | Refills: 6 | Status: SHIPPED | OUTPATIENT
Start: 2023-03-10

## 2023-03-10 SDOH — ECONOMIC STABILITY: INCOME INSECURITY: HOW HARD IS IT FOR YOU TO PAY FOR THE VERY BASICS LIKE FOOD, HOUSING, MEDICAL CARE, AND HEATING?: NOT HARD AT ALL

## 2023-03-10 SDOH — ECONOMIC STABILITY: HOUSING INSECURITY
IN THE LAST 12 MONTHS, WAS THERE A TIME WHEN YOU DID NOT HAVE A STEADY PLACE TO SLEEP OR SLEPT IN A SHELTER (INCLUDING NOW)?: NO

## 2023-03-10 SDOH — ECONOMIC STABILITY: FOOD INSECURITY: WITHIN THE PAST 12 MONTHS, THE FOOD YOU BOUGHT JUST DIDN'T LAST AND YOU DIDN'T HAVE MONEY TO GET MORE.: NEVER TRUE

## 2023-03-10 SDOH — ECONOMIC STABILITY: FOOD INSECURITY: WITHIN THE PAST 12 MONTHS, YOU WORRIED THAT YOUR FOOD WOULD RUN OUT BEFORE YOU GOT MONEY TO BUY MORE.: NEVER TRUE

## 2023-03-10 ASSESSMENT — PATIENT HEALTH QUESTIONNAIRE - PHQ9
SUM OF ALL RESPONSES TO PHQ QUESTIONS 1-9: 0
SUM OF ALL RESPONSES TO PHQ QUESTIONS 1-9: 0
2. FEELING DOWN, DEPRESSED OR HOPELESS: 0
SUM OF ALL RESPONSES TO PHQ QUESTIONS 1-9: 0
SUM OF ALL RESPONSES TO PHQ QUESTIONS 1-9: 0
1. LITTLE INTEREST OR PLEASURE IN DOING THINGS: 0
SUM OF ALL RESPONSES TO PHQ9 QUESTIONS 1 & 2: 0

## 2023-03-10 NOTE — PROGRESS NOTES
Park Kerr : 1970 Sex: female  Age: 46 y.o. Chief Complaint   Patient presents with    Other     3-month follow-up, follow-up chronic medical conditions         HPI:    Patient presents today for follow-up. She is tolerating medicines well. Says Ozempic working [facility]. Did not get blood work yet but will do so. Then she is open to go over 6 months if stable  Willing to do fit test, defers other colon cancer screening including colonoscopy or Cologuard at this time.   She did have a Pap and mammogram which she reports is negative, mammogram report reviewed, negative        Most Recent Labs  CBC  Lab Results   Component Value Date/Time    WBC 6.3 2022 08:01 AM    WBC 9.8 2022 01:03 PM    WBC 8.4 2021 12:00 AM    RBC 4.17 2022 08:01 AM    RBC 4.19 2022 01:03 PM    RBC 4.12 2021 12:00 AM    HGB 12.3 2022 08:01 AM    HGB 12.4 2022 01:03 PM    HGB 12.4 2021 12:00 AM    HCT 37.6 2022 08:01 AM    HCT 37.7 2022 01:03 PM    HCT 37.0 2021 12:00 AM    MCV 90.2 2022 08:01 AM    MCV 90.0 2022 01:03 PM    MCV 89.8 2021 12:00 AM     2022 08:01 AM     2022 01:03 PM     2021 12:00 AM      CMP  Lab Results   Component Value Date/Time     2022 08:01 AM     2022 01:03 PM     2021 12:00 AM    K 4.8 2022 08:01 AM    K 3.9 2022 01:03 PM    K 3.8 2021 12:00 AM     2022 08:01 AM    CL 98 2022 01:03 PM    CL 99 2021 12:00 AM    CO2 23 2022 08:01 AM    CO2 20 2022 01:03 PM    CO2 28 2021 12:00 AM    ANIONGAP 13 2022 08:01 AM    ANIONGAP 19 2022 01:03 PM    ANIONGAP 1.6 2021 12:00 AM    GLUCOSE 176 2022 08:01 AM    GLUCOSE 143 2022 01:03 PM    GLUCOSE 128 2021 12:00 AM    BUN 14 2022 08:01 AM    BUN 17 2022 01:03 PM    BUN 23 2021 12:00 AM CREATININE 0.7 07/28/2022 08:01 AM    CREATININE 0.8 03/30/2022 01:03 PM    CREATININE 0.92 07/02/2021 12:00 AM    LABGLOM >60 07/28/2022 08:01 AM    LABGLOM >60 03/30/2022 01:03 PM    LABGLOM 72 07/02/2021 12:00 AM    LABGLOM >60 02/19/2020 08:53 AM    GFRAA >60 07/28/2022 08:01 AM    GFRAA >60 03/30/2022 01:03 PM    GFRAA >60 02/19/2020 08:53 AM    CALCIUM 9.3 07/28/2022 08:01 AM    CALCIUM 9.4 03/30/2022 01:03 PM    CALCIUM 9.6 07/02/2021 12:00 AM    PROT 7.8 07/28/2022 08:01 AM    PROT 7.6 03/30/2022 01:03 PM    PROT 7.8 02/19/2020 08:53 AM    LABALBU 4.5 07/28/2022 08:01 AM    LABALBU 4.2 03/30/2022 01:03 PM    LABALBU 4.4 07/02/2021 12:00 AM    BILITOT 0.4 07/28/2022 08:01 AM    BILITOT 0.3 03/30/2022 01:03 PM    BILITOT 0.5 07/02/2021 12:00 AM    ALKPHOS 68 07/28/2022 08:01 AM    ALKPHOS 74 03/30/2022 01:03 PM    ALKPHOS 64 07/02/2021 12:00 AM    AST 17 07/28/2022 08:01 AM    AST 19 03/30/2022 01:03 PM    AST 18 07/02/2021 12:00 AM    ALT 18 07/28/2022 08:01 AM    ALT 16 03/30/2022 01:03 PM    ALT 21 07/02/2021 12:00 AM     A1C  Lab Results   Component Value Date/Time    LABA1C 8.4 07/28/2022 08:01 AM    LABA1C 8.7 03/30/2022 01:03 PM    LABA1C 7.5 07/02/2021 12:00 AM     TSH  Lab Results   Component Value Date/Time    TSH 1.770 07/28/2022 08:01 AM    TSH 2.470 03/30/2022 01:03 PM    TSH 2.95 07/08/2021 12:00 AM     FREET4  No results found for: [id number]  LIPID  Lab Results   Component Value Date/Time    CHOL 190 07/28/2022 08:01 AM    CHOL 185 03/30/2022 01:03 PM    CHOL 198 07/02/2021 12:00 AM    HDL 67 07/28/2022 08:01 AM    HDL 72 03/30/2022 01:03 PM    HDL 76 07/02/2021 12:00 AM    LDLCALC 102 07/28/2022 08:01 AM    LDLCALC 87 03/30/2022 01:03 PM    LDLCALC 96 07/02/2021 12:00 AM    TRIG 103 07/28/2022 08:01 AM    TRIG 132 03/30/2022 01:03 PM    TRIG 154 07/02/2021 12:00 AM    CHOLHDLRATIO 2.6 07/02/2021 12:00 AM     VITAMIN D  No results found for: VITD25  MAGNESIUM  No results found for: MG   PHOS  No results found for: PHOS   NIDHI   No results found for: NIDHI  RHEUMATOID FACTOR  No results found for: RF  PSA  No results found for: PSA   HEPATITIS C  No results found for: HCVABI  HIV  No results found for: ADK3HBM, HIV1QT  UA  Lab Results   Component Value Date/Time    COLORU Yellow 07/28/2022 08:01 AM    COLORU Yellow 03/30/2022 01:02 PM    COLORU Yellow 07/02/2021 12:00 AM    CLARITYU SL CLOUDY 07/28/2022 08:01 AM    CLARITYU CLOUDY 03/30/2022 01:02 PM    CLARITYU Clear 07/02/2021 12:00 AM    GLUCOSEU 500 07/28/2022 08:01 AM    GLUCOSEU Negative 03/30/2022 01:02 PM    GLUCOSEU neg 07/02/2021 12:00 AM    BILIRUBINUR Negative 07/28/2022 08:01 AM    BILIRUBINUR Negative 03/30/2022 01:02 PM    BILIRUBINUR Negative 07/02/2021 12:00 AM    BILIRUBINUR Negative 02/19/2020 08:52 AM    KETUA Negative 07/28/2022 08:01 AM    KETUA Negative 03/30/2022 01:02 PM    KETUA Negative 07/02/2021 12:00 AM    SPECGRAV 1.025 07/28/2022 08:01 AM    SPECGRAV >=1.030 03/30/2022 01:02 PM    SPECGRAV >=1.030 02/19/2020 08:52 AM    BLOODU Negative 07/28/2022 08:01 AM    BLOODU SMALL 03/30/2022 01:02 PM    BLOODU Negative 07/02/2021 12:00 AM    PHUR 5.5 07/28/2022 08:01 AM    PHUR 5.5 03/30/2022 01:02 PM    PHUR 5.5 07/02/2021 12:00 AM    PROTEINU Negative 07/28/2022 08:01 AM    PROTEINU Negative 03/30/2022 01:02 PM    PROTEINU Negative 07/02/2021 12:00 AM    UROBILINOGEN 0.2 07/28/2022 08:01 AM    UROBILINOGEN 0.2 03/30/2022 01:02 PM    UROBILINOGEN Normal 07/02/2021 12:00 AM    NITRU Negative 07/28/2022 08:01 AM    NITRU Negative 03/30/2022 01:02 PM    NITRU Negative 07/02/2021 12:00 AM    LEUKOCYTESUR Negative 07/28/2022 08:01 AM    LEUKOCYTESUR TRACE 03/30/2022 01:02 PM    LEUKOCYTESUR Negative 07/02/2021 12:00 AM     Urine Micro/Albumin Ratio  Lab Results   Component Value Date/Time    MALBCR - 07/28/2022 08:01 AM    MALBCR 6 07/02/2021 12:00 AM    MALBCR - 02/19/2020 08:52 AM       ROS:  Const: Denies chills, fever, malaise and sweats.  Eyes: Denies discharge, pain, redness and visual disturbance.  ENMT: Denies earache/symptoms denies nasal or sinus symptoms other than stated  above. Denies mouth and tongue lesions and sore throat.  CV: Denies chest discomfort, pain; diaphoresis, dizziness, edema, lightheadedness, orthopnea,  palpitations, syncope and near syncopal episode or any exertional symptoms  Resp: Denies cough, hemoptysis, pleuritic pain, SOB, sputum production and wheezing.  GI: Denies abdominal pain, change in bowel habits, hematochezia, melena, nausea and vomiting.  : Denies urinary symptoms including dysuria , urgency, frequency or hematuria.  Musculo: Denies musculoskeletal symptoms.  Skin: Denies bruising and rash.  Neuro: Denies headache, numbness, stiff neck, tingling and focal weakness slurred speech or facial  droop  Hema/Lymph: Denies bleeding/bruising tendency and enlarged lymph nodes        Current Outpatient Medications:     amLODIPine (NORVASC) 10 MG tablet, Take 1 tablet by mouth daily, Disp: 30 tablet, Rfl: 6    glipiZIDE (GLUCOTROL) 5 MG tablet, Take 1 tablet by mouth 2 times daily (before meals), Disp: 60 tablet, Rfl: 6    hydroCHLOROthiazide (HYDRODIURIL) 12.5 MG tablet, Take 1 tablet by mouth daily, Disp: 30 tablet, Rfl: 6    lisinopril (PRINIVIL;ZESTRIL) 40 MG tablet, Take 1 tablet by mouth daily, Disp: 30 tablet, Rfl: 6    metFORMIN (GLUCOPHAGE) 1000 MG tablet, Take 1 tablet by mouth 2 times daily (with meals), Disp: 60 tablet, Rfl: 6    metoprolol succinate (TOPROL XL) 50 MG extended release tablet, Take 1 tablet by mouth 2 times daily, Disp: 60 tablet, Rfl: 6    Semaglutide,0.25 or 0.5MG/DOS, 2 MG/1.5ML SOPN, Inject 0.5 mg into the skin once a week, Disp: 1.5 mL, Rfl: 6    simvastatin (ZOCOR) 20 MG tablet, Take 1 tablet by mouth daily, Disp: 30 tablet, Rfl: 6    glucose monitoring kit (FREESTYLE) monitoring kit, 1 kit by Does not apply route daily, Disp: 1 kit, Rfl: 0    Lancets MISC, 1 each by Does not  apply route 2 times daily, Disp: 200 each, Rfl: 3    blood glucose monitor strips, Test 1-2times qd, Disp: 200 strip, Rfl: 1  Allergies   Allergen Reactions    Potassium-Containing Compounds        No past medical history on file. No past surgical history on file. Family History   Problem Relation Age of Onset    Uterine Cancer Maternal Aunt      Social History     Tobacco Use    Smoking status: Former     Packs/day: 1.50     Years: 4.00     Pack years: 6.00     Types: Cigarettes     Quit date: 1991     Years since quittin.2    Smokeless tobacco: Never      Social History     Social History Narrative    PMH:    Problem List: Essential hypertension    Health Maintenance:    Mammogram - (2019)    Mammogram Screening - (2019)    Medical Problems:    Hypertension    [place] - CPAP    gestational DM, Type 2 Diabetes    Surgical Hx:     Section    Reviewed, no changes. FH:        Father:    . (Hx)    Mother:    . (Hx)Mom - DM, HTN    Dad - doesn't know    Reviewed, no changes. SH:    . 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 Alcohol: Denies alcohol    use. Reviewed, no changes. Vitals:    03/10/23 0803   BP: 136/88   Pulse: 80   Temp: 98 °F (36.7 °C)   SpO2: 98%   Weight: 210 lb (95.3 kg)      Wt Readings from Last 3 Encounters:   03/10/23 210 lb (95.3 kg)   22 190 lb (86.2 kg)   22 195 lb (88.5 kg)          Exam:  Const: Appears comfortable. No signs of acute distress present. Head/Face: Atraumatic on inspection. Eyes: EOMI in both eyes. No discharge from the eyes. PERRL. Sclerae clear. ENMT: Ears clear, tympanic membranes: intact and translucent. External nose WNL. Nasal mucosa is clear. Oropharynx: No erythema or exudate. Posterior pharynx is normal.  Neck: Supple. Palpation reveals no adenopathy.  No masses appreciated. No JVD. Carotids: no  bruits. Resp: Respirations are unlabored. Clear to auscultation. No rales, rhonchi or wheezes appreciated  over the lungs bilaterally. CV: Rate is regular. Rhythm is regular. No gallop or rubs. No heart murmur appreciated. Extremities: No clubbing, cyanosis, or edema. No calf inflammation or tenderness. Abdomen: Bowel sounds are normoactive. Abdomen is soft, nontender, and nondistended. No  abdominal masses. No palpable hepatosplenomegaly. Lymph: No palpable or visible regional lymphadenopathy. Musculoskeletal: no acute joint inflammation. Skin: Dry and warm with no rash. Skin normal to inspection and palpation overall. Neuro: Alert and oriented. Affect: appropriate. Upper Extremities: 5/5 bilaterally. Lower Extremities:  5/5 bilaterally. Sensation intact to light touch. Reflexes: DTR's are symmetric and 2+ bilaterally. .  Cranial Nerves: Cranial nerves grossly intact. Assessment and Plan:   Diagnosis Orders   1. Essential hypertension  amLODIPine (NORVASC) 10 MG tablet    hydroCHLOROthiazide (HYDRODIURIL) 12.5 MG tablet    lisinopril (PRINIVIL;ZESTRIL) 40 MG tablet    metoprolol succinate (TOPROL XL) 50 MG extended release tablet      2. Type 2 diabetes mellitus with hyperglycemia, without long-term current use of insulin (HCC)  glipiZIDE (GLUCOTROL) 5 MG tablet    metFORMIN (GLUCOPHAGE) 1000 MG tablet    Semaglutide,0.25 or 0.5MG/DOS, 2 MG/1.5ML SOPN      3. Mixed hyperlipidemia  simvastatin (ZOCOR) 20 MG tablet      4. Colon cancer screening  POCT Fecal Immunochemical Test (FIT)      5. Thrombophilia ([facility] [address]        6. Liver disease        7. CHINO (obstructive sleep apnea)        8. Hematuria, unspecified type        9. Health maintenance examination                Essential hypertension  Says excellent at home. Counseled. The risks of hypertension and hypotension reviewed. Watch closely ambulatory.  Hyper and hypotensive precautions and parameters reviewed and written as well as parameters on pulse, call if out of range, ER dangers numbers. Lifestyle modification reviewed. Tolerating therapy. Continue current therapy    Type 2 diabetes mellitus with hyperglycemia, without long-term current use of insulin ([facility] [address]  Much better she states. Tolerating therapy . Not interested in seeing endocrinologist.  Lifestyle location emphasized. Hyper and hypoglycemic precautions reviewed. She's had some diabetic retinopathy needs to follow  closely with ophthamology. Watch ambulatory. If out of range, let us know. Stressed importance of daily foot examinations,  regular eye examinations etc. micro-and macrovascular complications reviewed . hyper and  hypoglycemic precautions reviewed at length. On glipizide 5 mg twice a day with standard precaution including hypoglycemia. She is currently on Metformin with standard precaution including LA/diarrhea, and Januvia with standard precautions. She is not interested in starting [place], she feels she would have a high likelihood of yeast infection and other issues. Tolerating Ozempic 0.5 with standard precautions, denies personal or family history of thyroid CA/pancreatic issues. Thrombophilia ([facility] [address]  Counseled extensively. Differential reviewed, including serious etiologies. .  It was mild, normalized. Asymptomatic. Monitor      Mixed hyperlipidemia  lifestyle modification reviewed. risk of hyperlipidemia reviewed tolerating therapy. Goals reviewed,  Dose-dependent benefit reviewed, various potency statins reviewed, defers change in therapy. Continue current therapy. Side effects instructions ADRs reviewed discontinue if any    CHINO (obstructive sleep apnea)  Compliant. Feels very well with it, asymptomatic. Uses at least 6 to 8 hours per night 7 nights per week. Liver disease  Counseled extensively. Differential reviewed, including serious etiologies. Likely fatty liver. Precautions reviewed.  Lifestyle modification appropriate diet and weight loss reviewed. Risks of  even this   reviewed. Hepatitis C screen negative. Repeat LFTs normal    Health maintenance examination  Counseled at length  7/21. Encourage yearly. Declines colon ca screening/fit test. mammo ordered    Hematuria  Repeat normalized      No flowsheet data found. Plan as above. Counseled extensively and differential diagnoses relevant to above were reviewed, including serious etiologies, risks and complications, especially of left uncontrolled. If relevant, instructions and  alternatives to meds/treatment reviewed, as well as interactions, and  SE's/ADRs reviewed, notify immediately if any, discontinuing new meds if any. Plan made after discussion and shared decision making. In very brief summary, multiple refills, blood work reordered, she will follow-up in about a week virtually to review and she is hoping to go 6 months if possible follow-up sooner as needed      As long as symptoms steadily improve/resolve, and medical conditions follow the expected course, FU as below, sooner PRN. Return for virtual next wk. Educational materials and/or home exercises printed for patient's review and were included in patient instructions on his/her After Visit Summary and given to patient at the end of visit. After discussion, patient and/or guardian verbalizes understanding, agrees, feels comfortable with and wishes to proceed with above treatment plan. Call for any pending results, FU sooner if abnormal, as needed or if any current symptoms persist/worsen. Advised patient to call with any new medication issues, and read all Rx info from pharmacy to assure aware of all possible risks and side effects of medication before taking. Reviewed age and gender appropriate health screening exams and vaccinations.   Advised patient regarding importance of keeping up with recommended health maintenance and to schedule as soon as possible if overdue, as this is important in assessing for undiagnosed pathology, especially cancer, as well as protecting against potentially harmful/life threatening disease. Patient and/or guardian verbalizes understanding and agrees with above counseling, assessment and plan. All questions answered. Signs and symptoms to watch for discussed, serious signs and symptoms reviewed. ER if any. Surendra Valdez MD    Patients are advised to check with insurance company to ensure coverage and to fully understand benefits and cost prior to any testing. This note was created with the assistance of voice recognition software. Document was reviewed however may contain grammatical errors.

## 2023-03-16 ENCOUNTER — TELEMEDICINE (OUTPATIENT)
Dept: PRIMARY CARE CLINIC | Age: 53
End: 2023-03-16
Payer: COMMERCIAL

## 2023-03-16 DIAGNOSIS — D68.59 THROMBOPHILIA (HCC): ICD-10-CM

## 2023-03-16 DIAGNOSIS — G47.33 OSA (OBSTRUCTIVE SLEEP APNEA): ICD-10-CM

## 2023-03-16 DIAGNOSIS — R31.9 HEMATURIA, UNSPECIFIED TYPE: ICD-10-CM

## 2023-03-16 DIAGNOSIS — I10 ESSENTIAL HYPERTENSION: ICD-10-CM

## 2023-03-16 DIAGNOSIS — E11.65 TYPE 2 DIABETES MELLITUS WITH HYPERGLYCEMIA, WITHOUT LONG-TERM CURRENT USE OF INSULIN (HCC): Primary | ICD-10-CM

## 2023-03-16 DIAGNOSIS — E78.2 MIXED HYPERLIPIDEMIA: ICD-10-CM

## 2023-03-16 PROCEDURE — 3044F HG A1C LEVEL LT 7.0%: CPT | Performed by: FAMILY MEDICINE

## 2023-03-16 PROCEDURE — 99214 OFFICE O/P EST MOD 30 MIN: CPT | Performed by: FAMILY MEDICINE

## 2023-03-16 NOTE — PROGRESS NOTES
TeleMedicine Patient Consent    This visit was performed as a virtual video visit using a synchronous, two-way, audio-video telehealth technology platform. Patient identification was verified at the start of the visit, including the patient's telephone number and physical location. I discussed with the patient the nature of our telehealth visits, that:     Due to the nature of an audio- video modality, the only components of a physical exam that could be done are the elements supported by direct observation. I would evaluate the patient and recommend diagnostics and treatments based on my assessment. If it was felt that the patient should be evaluated in clinic or an emergency room setting, then they would be directed there. Our sessions are not being recorded and that personal health information is protected. Our team would provide follow up care in person if/when the patient needs it. Patient does agree to proceed with telemedicine consultation. Patient's location: home address in [facility]    Physician  location other address in [place]     Other people involved in call:   None    This visit was completed virtually using Doxy. me    3/16/2023    TELEHEALTH EVALUATION -- Audio/Visual (During [id number] public health emergency)    Chief Complaint   Patient presents with    Discuss Labs           HPI:    Adriana Spence (:  1970) has requested an audio/video evaluation for the following concern(s):    Presents for follow-up. Feels well.   Labs overall much improved, CO2 21 glucose 117 HDL 73   triglyceride 138 LFTs normal A1c down to 6.9 goals reviewed TSH 2.2 CBC with differential normal urinalysis trace blood dipstick 0-1 microscopically microalbumin creatinine ratio-NC        The 10-year ASCVD risk score (Eduar ZHANG, et al., 2019) is: 4.1%    Values used to calculate the score:      Age: 46 years      Sex: Female      Is Non- : No      Diabetic: Yes      Tobacco smoker: No      Systolic Blood Pressure: 136 mmHg      Is BP treated: Yes      HDL Cholesterol: 73 mg/dL      Total Cholesterol: 206 mg/dL    Most Recent Labs  CBC  Lab Results   Component Value Date/Time    WBC 8.2 03/10/2023 09:31 AM    WBC 6.3 07/28/2022 08:01 AM    WBC 9.8 03/30/2022 01:03 PM    RBC 4.38 03/10/2023 09:31 AM    RBC 4.17 07/28/2022 08:01 AM    RBC 4.19 03/30/2022 01:03 PM    HGB 12.8 03/10/2023 09:31 AM    HGB 12.3 07/28/2022 08:01 AM    HGB 12.4 03/30/2022 01:03 PM    HCT 38.9 03/10/2023 09:31 AM    HCT 37.6 07/28/2022 08:01 AM    HCT 37.7 03/30/2022 01:03 PM    MCV 88.8 03/10/2023 09:31 AM    MCV 90.2 07/28/2022 08:01 AM    MCV 90.0 03/30/2022 01:03 PM     03/10/2023 09:31 AM     07/28/2022 08:01 AM     03/30/2022 01:03 PM      CMP  Lab Results   Component Value Date/Time     03/10/2023 09:31 AM     07/28/2022 08:01 AM     03/30/2022 01:03 PM    K 4.2 03/10/2023 09:31 AM    K 4.8 07/28/2022 08:01 AM    K 3.9 03/30/2022 01:03 PM    CL 98 03/10/2023 09:31 AM     07/28/2022 08:01 AM    CL 98 03/30/2022 01:03 PM    CO2 21 03/10/2023 09:31 AM    CO2 23 07/28/2022 08:01 AM    CO2 20 03/30/2022 01:03 PM    ANIONGAP 16 03/10/2023 09:31 AM    ANIONGAP 13 07/28/2022 08:01 AM    ANIONGAP 19 03/30/2022 01:03 PM    GLUCOSE 117 03/10/2023 09:31 AM    GLUCOSE 176 07/28/2022 08:01 AM    GLUCOSE 143 03/30/2022 01:03 PM    BUN 14 03/10/2023 09:31 AM    BUN 14 07/28/2022 08:01 AM    BUN 17 03/30/2022 01:03 PM    CREATININE 0.6 03/10/2023 09:31 AM    CREATININE 0.7 07/28/2022 08:01 AM    CREATININE 0.8 03/30/2022 01:03 PM    LABGLOM >60 03/10/2023 09:31 AM    LABGLOM >60 07/28/2022 08:01 AM    LABGLOM >60 03/30/2022 01:03 PM    LABGLOM 72 07/02/2021 12:00 AM    LABGLOM >60 02/19/2020 08:53 AM    GFRAA >60 07/28/2022 08:01 AM    GFRAA >60 03/30/2022 01:03 PM    GFRAA >60 02/19/2020 08:53 AM    CALCIUM 9.2 03/10/2023 09:31 AM    CALCIUM 9.3 07/28/2022 08:01 AM    CALCIUM  9.4 03/30/2022 01:03 PM    PROT 7.5 03/10/2023 09:31 AM    PROT 7.8 07/28/2022 08:01 AM    PROT 7.6 03/30/2022 01:03 PM    LABALBU 4.1 03/10/2023 09:31 AM    LABALBU 4.5 07/28/2022 08:01 AM    LABALBU 4.2 03/30/2022 01:03 PM    BILITOT 0.4 03/10/2023 09:31 AM    BILITOT 0.4 07/28/2022 08:01 AM    BILITOT 0.3 03/30/2022 01:03 PM    ALKPHOS 70 03/10/2023 09:31 AM    ALKPHOS 68 07/28/2022 08:01 AM    ALKPHOS 74 03/30/2022 01:03 PM    AST 27 03/10/2023 09:31 AM    AST 17 07/28/2022 08:01 AM    AST 19 03/30/2022 01:03 PM    ALT 22 03/10/2023 09:31 AM    ALT 18 07/28/2022 08:01 AM    ALT 16 03/30/2022 01:03 PM     A1C  Lab Results   Component Value Date/Time    LABA1C 6.9 03/10/2023 09:31 AM    LABA1C 8.4 07/28/2022 08:01 AM    LABA1C 8.7 03/30/2022 01:03 PM     TSH  Lab Results   Component Value Date/Time    TSH 2.250 03/10/2023 09:31 AM    TSH 1.770 07/28/2022 08:01 AM    TSH 2.470 03/30/2022 01:03 PM     FREET4  No results found for: C2FIUOP  LIPID  Lab Results   Component Value Date/Time    CHOL 206 03/10/2023 09:31 AM    CHOL 190 07/28/2022 08:01 AM    CHOL 185 03/30/2022 01:03 PM    HDL 73 03/10/2023 09:31 AM    HDL 67 07/28/2022 08:01 AM    HDL 72 03/30/2022 01:03 PM    LDLCALC 105 03/10/2023 09:31 AM    LDLCALC 102 07/28/2022 08:01 AM    LDLCALC 87 03/30/2022 01:03 PM    TRIG 138 03/10/2023 09:31 AM    TRIG 103 07/28/2022 08:01 AM    TRIG 132 03/30/2022 01:03 PM    CHOLHDLRATIO 2.6 07/02/2021 12:00 AM     VITAMIN D  No results found for: VITD25  MAGNESIUM  No results found for: MG   PHOS  No results found for: PHOS   NIDHI   No results found for: NIDHI  RHEUMATOID FACTOR  No results found for: RF  PSA  No results found for: PSA   HEPATITIS C  No results found for: HCVABI  HIV  No results found for: UFM0NHA, HIV1QT  UA  Lab Results   Component Value Date/Time    COLORU Yellow 03/10/2023 01:04 PM    COLORU Yellow 07/28/2022 08:01 AM    COLORU Yellow 03/30/2022 01:02 PM    CLARITYU Clear 03/10/2023 01:04 PM    CLARITYU SL CLOUDY 07/28/2022 08:01 AM    CLARITYU CLOUDY 03/30/2022 01:02 PM    GLUCOSEU Negative 03/10/2023 01:04 PM    GLUCOSEU 500 07/28/2022 08:01 AM    GLUCOSEU Negative 03/30/2022 01:02 PM    BILIRUBINUR Negative 03/10/2023 01:04 PM    BILIRUBINUR Negative 07/28/2022 08:01 AM    BILIRUBINUR Negative 03/30/2022 01:02 PM    BILIRUBINUR Negative 07/02/2021 12:00 AM    KETUA Negative 03/10/2023 01:04 PM    KETUA Negative 07/28/2022 08:01 AM    KETUA Negative 03/30/2022 01:02 PM    SPECGRAV 1.020 03/10/2023 01:04 PM    SPECGRAV 1.025 07/28/2022 08:01 AM    SPECGRAV >=1.030 03/30/2022 01:02 PM    BLOODU TRACE-INTACT 03/10/2023 01:04 PM    BLOODU Negative 07/28/2022 08:01 AM    BLOODU SMALL 03/30/2022 01:02 PM    PHUR 5.5 03/10/2023 01:04 PM    PHUR 5.5 07/28/2022 08:01 AM    PHUR 5.5 03/30/2022 01:02 PM    PROTEINU Negative 03/10/2023 01:04 PM    PROTEINU Negative 07/28/2022 08:01 AM    PROTEINU Negative 03/30/2022 01:02 PM    UROBILINOGEN 0.2 03/10/2023 01:04 PM    UROBILINOGEN 0.2 07/28/2022 08:01 AM    UROBILINOGEN 0.2 03/30/2022 01:02 PM    NITRU Negative 03/10/2023 01:04 PM    NITRU Negative 07/28/2022 08:01 AM    NITRU Negative 03/30/2022 01:02 PM    LEUKOCYTESUR Negative 03/10/2023 01:04 PM    LEUKOCYTESUR Negative 07/28/2022 08:01 AM    LEUKOCYTESUR TRACE 03/30/2022 01:02 PM     Urine Micro/Albumin Ratio  Lab Results   Component Value Date/Time    MALBCR - 03/10/2023 01:04 PM    MALBCR - 07/28/2022 08:01 AM    MALBCR 6 07/02/2021 12:00 AM         ROS:  Const: Denies chills, fever, malaise and sweats. Eyes: Denies discharge, pain, redness and visual disturbance. ENMT: Denies earaches, other ear symptoms. Denies nasal or sinus symptoms other than stated  above. Denies mouth and tongue lesions and sore throat.   CV: Denies chest discomfort, pain; diaphoresis, dizziness, edema, lightheadedness, orthopnea,  palpitations, syncope and near syncopal episode or any exertional symptoms  Resp: Denies cough, hemoptysis, pleuritic pain, SOB, sputum production and wheezing. GI: Denies abdominal pain, change in bowel habits, hematochezia, melena, nausea and vomiting. : Denies urinary symptoms including dysuria , urgency, frequency or hematuria. Musculo: Denies musculoskeletal symptoms. Skin: Denies bruising and rash. Neuro: Denies headache, numbness, stiff neck, tingling and focal weakness slurred speech or facial  droop  Hema/Lymph: Denies bleeding/bruising tendency and enlarged lymph nodes         Current Outpatient Medications:     Semaglutide, 1 MG/DOSE, 4 MG/3ML SOPN, Inject 1 mg into the skin once a week, Disp: 3 mL, Rfl: 3    amLODIPine (NORVASC) 10 MG tablet, Take 1 tablet by mouth daily, Disp: 30 tablet, Rfl: 6    glipiZIDE (GLUCOTROL) 5 MG tablet, Take 1 tablet by mouth 2 times daily (before meals), Disp: 60 tablet, Rfl: 6    hydroCHLOROthiazide (HYDRODIURIL) 12.5 MG tablet, Take 1 tablet by mouth daily, Disp: 30 tablet, Rfl: 6    lisinopril (PRINIVIL;ZESTRIL) 40 MG tablet, Take 1 tablet by mouth daily, Disp: 30 tablet, Rfl: 6    metFORMIN (GLUCOPHAGE) 1000 MG tablet, Take 1 tablet by mouth 2 times daily (with meals), Disp: 60 tablet, Rfl: 6    metoprolol succinate (TOPROL XL) 50 MG extended release tablet, Take 1 tablet by mouth 2 times daily, Disp: 60 tablet, Rfl: 6    simvastatin (ZOCOR) 20 MG tablet, Take 1 tablet by mouth daily, Disp: 30 tablet, Rfl: 6    glucose monitoring kit (FREESTYLE) monitoring kit, 1 kit by Does not apply route daily, Disp: 1 kit, Rfl: 0    Lancets MISC, 1 each by Does not apply route 2 times daily, Disp: 200 each, Rfl: 3    blood glucose monitor strips, Test 1-2times qd, Disp: 200 strip, Rfl: 1  Allergies   Allergen Reactions    Potassium-Containing Compounds        No past medical history on file. No past surgical history on file.   Family History   Problem Relation Age of Onset    Uterine Cancer Maternal Aunt      Social History     Tobacco Use    Smoking status: Former     Packs/day: 1.50     Years: 4.00     Pack years: 6.00     Types: Cigarettes     Quit date: 1991     Years since quittin.2    Smokeless tobacco: Never     Social History     Social History Narrative    PMH:    Problem List: Essential hypertension    Health Maintenance:    Mammogram - (2019)    Mammogram Screening - (2019)    Medical Problems:    Hypertension    Tavo - CPAP    gestational DM, Type 2 Diabetes    Surgical Hx:     Section    Reviewed, no changes. FH:        Father:    . (Hx)    Mother:    . (Hx)Mom - DM, HTN    Dad - doesn't know    Reviewed, no changes. SH:    . 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 Alcohol: Denies alcohol    use. Reviewed, no changes. PHYSICAL EXAMINATION:  [ INSTRUCTIONS:  \"[x]\" Indicates a positive item  \"[]\" Indicates a negative item  -- DELETE ALL ITEMS NOT EXAMINED]  Vital Signs: (As obtained by patient/caregiver or practitioner observation)    There were no vitals filed for this visit. Blood pressure-  Heart rate-    Respiratory rate-    Temperature-  Pulse oximetry-     Constitutional: [x] Appears well-developed and well-nourished [x] No apparent distress      [] Abnormal-   Mental status  [x] Alert and awake  [x] Oriented to person/place/time [x]Able to follow commands      Eyes:  EOM    [x]  Normal  [] Abnormal-  Sclera  [x]  Normal  [] Abnormal -         Discharge [x]  None visible  [] Abnormal -    HENT:   [x] Normocephalic, atraumatic.   [] Abnormal   [x] Mouth/Throat: Mucous membranes are moist.     External Ears [x] Normal  [] Abnormal-     Neck: [x] No visualized mass     Pulmonary/Chest: [x] Respiratory effort normal.  [x] No visualized signs of difficulty breathing or respiratory distress        [] Abnormal-      Musculoskeletal:   [x] Normal gait with no signs of ataxia [x] Normal range of motion of neck        [] Abnormal-       Neurological:        [x] No Facial Asymmetry (Cranial nerve 7 motor function) (limited exam to video visit)          [x] No gaze palsy        [] Abnormal-         Skin:        [x] No significant exanthematous lesions or discoloration noted on facial skin         [] Abnormal-            Psychiatric:       [x] Normal Affect [x] No Hallucinations        [] Abnormal-     Other pertinent observable physical exam findings-     ASSESSMENT/PLAN:   Diagnosis Orders   1. Type 2 diabetes mellitus with hyperglycemia, without long-term current use of insulin (HCC)  Semaglutide, 1 MG/DOSE, 4 MG/3ML SOPN    Comprehensive Metabolic Panel    Hemoglobin A1C    Urinalysis    Microalbumin / Creatinine Urine Ratio      2. Essential hypertension        3. Mixed hyperlipidemia  CK    Lipid Panel    CK      4. CHINO (obstructive sleep apnea)        5. Hematuria, unspecified type        6. Thrombophilia ([facility] [address]            No problem-specific Assessment & Plan notes found for this encounter. Essential hypertension  Says excellent at home. Counseled. The risks of hypertension and hypotension reviewed. Watch closely ambulatory. Hyper and hypotensive precautions and parameters reviewed and written as well as parameters on pulse, call if out of range, ER dangers numbers. Lifestyle modification reviewed. Tolerating therapy. Continue current therapy     Type 2 diabetes mellitus with hyperglycemia, without long-term current use of insulin (HCC)  Improved, hemoglobin A1c 6.9. She would like further improvement. Tolerating therapy . Not interested in seeing endocrinologist.  Lifestyle location emphasized. Hyper and hypoglycemic precautions reviewed. She's had some diabetic retinopathy needs to follow  closely with ophthamology. Watch ambulatory. If out of range, let us know.  Stressed importance of daily foot examinations,  regular eye examinations etc. micro-and macrovascular complications reviewed . hyper and  hypoglycemic precautions reviewed at length. On glipizide 5 mg twice a day with standard precaution including hypoglycemia. She is currently on Metformin with standard precautions including LA/diarrhea, and Januvia with standard precautions. She is not interested in starting [place], she feels she would have a high likelihood of yeast infection and other issues. Tolerating Ozempic 0.5 with standard precautions, denies personal or family history of thyroid CA/pancreatic issues. Increase to 1 mg with standard precautions. Decrease glipizide to 2.5 mg twice a day and adjust dose to lowest effective dose ultimately would like to discontinue if able. Would like blood work and follow-up 3 months sooner as needed. Thrombophilia ([facility] [address]  Counseled extensively. Differential reviewed, including serious etiologies. .  It was mild, normalized. Asymptomatic. Monitor       Mixed hyperlipidemia  lifestyle modification reviewed. risk of hyperlipidemia reviewed tolerating therapy. Goals reviewed,  Dose-dependent benefit reviewed, various potency statins reviewed, defers change in therapy. Continue current therapy. Side effects instructions ADRs reviewed discontinue if any     CHINO (obstructive sleep apnea)  Compliant. Feels very well with it, asymptomatic. Uses at least 6 to 8 hours per night 7 nights per week. Liver disease  Counseled extensively. Differential reviewed, including serious etiologies. Likely fatty liver. Precautions reviewed. Lifestyle modification appropriate diet and weight loss reviewed. Risks of  even this   reviewed. Hepatitis C screen negative. Repeat LFTs normal     Health maintenance examination  Counseled at length  7/21. Encourage yearly. Declines colon ca screening/fit test. mammo ordered     Hematuria  Counseled extensively. Differential reviewed, including serious etiologies. Fluctuates now trace dipstick 0-1 microscopically. Defers other E/T. Precaution reviewed. Plan as above. Counseled extensively and differential diagnoses relevant to above were reviewed, including serious etiologies, risks and complications, especially of left uncontrolled. If relevant, instructions and  alternatives to meds/treatment reviewed, as well as interactions, and  SE's/ADRs reviewed, notify immediately if any, discontinuing new meds if any. Plan made after discussion and shared decision making. Increase Ozempic. Blood work follow-up 3 months sooner as needed. As long as symptoms steadily improve/resolve and medical conditions are following the expected course, FU as below, sooner PRN      Return in about 3 months (around 6/16/2023), or if symptoms worsen or fail to improve. Educational materials and/or home exercises printed for patient's review and were included in patient instructions on his/her After Visit Summary and given to patient at the end of visit. After discussion, patient and/or guardian verbalizes understanding, agrees, feels comfortable with and wishes to proceed with above treatment plan. Call for any pending results, FU sooner if abnormal, as needed or if any current symptoms persist/worsen. Advised patient to call with any new medication issues, and read all Rx info from pharmacy to assure aware of all possible risks and side effects of medication before taking. Reviewed age and gender appropriate health screening exams and vaccinations. Advised patient regarding importance of keeping up with recommended health maintenance and to schedule as soon as possible if overdue, as this is important in assessing for undiagnosed pathology, especially cancer, as well as protecting against potentially harmful/life threatening disease. Patient and/or guardian verbalizes understanding and agrees with above counseling, assessment and plan. All questions answered.           Signs and symptoms to watch for discussed, serious signs and symptoms reviewed. ER if any. Time spent: Greater than Not billed by time        Jenelle Headings, was evaluated through a synchronous (real-time) audio-video encounter. The patient (or guardian if applicable) is aware that this is a billable service, which includes applicable co-pays. This Virtual Visit was conducted with patient's (and/or legal guardian's) consent. The visit was conducted pursuant to the emergency declaration under the [address] [place] [address] authority and the [employer] General Act. Patient identification was verified, and a caregiver was present when appropriate. The patient was located in a state where the provider was licensed to provide care. The patient (and/or legal guardian) has also been advised to contact this office for worsening conditions or problems, and seek emergency medical treatment and/or call 911 if deemed necessary. As this was a virtual encounter, patient (and /or legal guardian) was instructed on various ways to be seen in person. Patients are advised to check with insurance company to ensure coverage and to fully understand benefits and cost prior to any testing to try to avoid unexpected charges. This note was created with the assistance of voice recognition software. Inadvertent errors may be present. Signs and symptoms to watch for were discussed. Serious signs and symptoms reviewed. ER if any    --Olivier Caceres MD on 3/16/2023 at 12:01 PM    An electronic signature was used to authenticate this note.

## 2023-04-27 ENCOUNTER — TELEPHONE (OUTPATIENT)
Dept: PRIMARY CARE CLINIC | Age: 53
End: 2023-04-27

## 2023-07-05 DIAGNOSIS — E11.65 TYPE 2 DIABETES MELLITUS WITH HYPERGLYCEMIA, WITHOUT LONG-TERM CURRENT USE OF INSULIN (HCC): ICD-10-CM

## 2023-07-05 NOTE — TELEPHONE ENCOUNTER
----- Message from Martha Abdalla sent at 7/5/2023  8:24 AM EDT -----  Subject: Refill Request    QUESTIONS  Name of Medication? Semaglutide, 1 MG/DOSE, 4 MG/3ML SOPN  Patient-reported dosage and instructions? 1 MG  once a week into the skin  How many days do you have left? 14  Preferred Pharmacy? [address] [facility] phone number (if available)? [phone]  Additional Information for Provider? Patient only has enough to make it to   the appt and wants to know if she can get a refill before the appt  ---------------------------------------------------------------------------  --------------,  Name of Medication? glucose monitoring kit (FREESTYLE) monitoring kit  Patient-reported dosage and instructions? n/a  How many days do you have left? 0  Preferred Pharmacy? [address] [facility] phone number (if available)? [phone]  Additional Information for Provider? Patient's monitor broke and was   wondering if she can get a new one.  ---------------------------------------------------------------------------  --------------  CALL BACK INFO  What is the best way for the office to contact you? OK to leave message on   voicemail  Preferred Call Back Phone Number? [phone]  ---------------------------------------------------------------------------  --------------  SCRIPT ANSWERS  Relationship to Patient?  Self

## 2023-07-28 DIAGNOSIS — E78.2 MIXED HYPERLIPIDEMIA: ICD-10-CM

## 2023-07-28 DIAGNOSIS — E11.65 TYPE 2 DIABETES MELLITUS WITH HYPERGLYCEMIA, WITHOUT LONG-TERM CURRENT USE OF INSULIN (HCC): ICD-10-CM

## 2023-07-28 LAB
ALBUMIN SERPL-MCNC: 4.3 G/DL (ref 3.5–5.2)
ALP BLD-CCNC: 72 U/L (ref 35–104)
ALT SERPL-CCNC: 16 U/L (ref 0–32)
ANION GAP SERPL CALCULATED.3IONS-SCNC: 12 MMOL/L (ref 7–16)
AST SERPL-CCNC: 16 U/L (ref 0–31)
BILIRUB SERPL-MCNC: 0.3 MG/DL (ref 0–1.2)
BILIRUBIN URINE: NEGATIVE
BUN BLDV-MCNC: 12 MG/DL (ref 6–20)
CALCIUM SERPL-MCNC: 9.1 MG/DL (ref 8.6–10.2)
CHLORIDE BLD-SCNC: 99 MMOL/L (ref 98–107)
CHOLESTEROL: 213 MG/DL
CO2: 25 MMOL/L (ref 22–29)
COLOR: YELLOW
CREAT SERPL-MCNC: 0.7 MG/DL (ref 0.5–1)
EPITHELIAL CELLS UA: ABNORMAL /HPF
GFR SERPL CREATININE-BSD FRML MDRD: >60 ML/MIN/1.73M2
GLUCOSE BLD-MCNC: 102 MG/DL (ref 74–99)
GLUCOSE URINE: NEGATIVE MG/DL
HDLC SERPL-MCNC: 66 MG/DL
KETONES, URINE: NEGATIVE MG/DL
LDL CHOLESTEROL: 117 MG/DL
LEUKOCYTE ESTERASE, URINE: NEGATIVE
NITRITE, URINE: NEGATIVE
PH UA: 7 (ref 5–9)
POTASSIUM SERPL-SCNC: 4.4 MMOL/L (ref 3.5–5)
PROTEIN UA: NEGATIVE MG/DL
RBC UA: ABNORMAL /HPF
SODIUM BLD-SCNC: 136 MMOL/L (ref 132–146)
SPECIFIC GRAVITY UA: 1.01 (ref 1–1.03)
TOTAL CK: 108 U/L (ref 20–180)
TOTAL PROTEIN: 7.4 G/DL (ref 6.4–8.3)
TRIGL SERPL-MCNC: 149 MG/DL
TURBIDITY: CLEAR
URINE HGB: ABNORMAL
UROBILINOGEN, URINE: 0.2 EU/DL (ref 0–1)
VLDLC SERPL CALC-MCNC: 30 MG/DL
WBC UA: ABNORMAL /HPF
YEAST: PRESENT

## 2023-07-29 LAB
CREATININE URINE: 38.1 MG/DL (ref 29–226)
HBA1C MFR BLD: 7.8 % (ref 4–5.6)
MICROALBUMIN/CREAT 24H UR: <12 MG/L (ref 0–19)
MICROALBUMIN/CREAT UR-RTO: NORMAL MCG/MG CREAT (ref 0–30)

## 2023-08-01 ENCOUNTER — OFFICE VISIT (OUTPATIENT)
Dept: PRIMARY CARE CLINIC | Age: 53
End: 2023-08-01
Payer: COMMERCIAL

## 2023-08-01 VITALS
HEART RATE: 50 BPM | DIASTOLIC BLOOD PRESSURE: 80 MMHG | SYSTOLIC BLOOD PRESSURE: 132 MMHG | BODY MASS INDEX: 33.2 KG/M2 | TEMPERATURE: 97.6 F | WEIGHT: 212 LBS | OXYGEN SATURATION: 98 %

## 2023-08-01 DIAGNOSIS — E11.65 TYPE 2 DIABETES MELLITUS WITH HYPERGLYCEMIA, WITHOUT LONG-TERM CURRENT USE OF INSULIN (HCC): ICD-10-CM

## 2023-08-01 DIAGNOSIS — Z23 ENCOUNTER FOR IMMUNIZATION: ICD-10-CM

## 2023-08-01 DIAGNOSIS — G47.33 OSA (OBSTRUCTIVE SLEEP APNEA): ICD-10-CM

## 2023-08-01 DIAGNOSIS — E78.2 MIXED HYPERLIPIDEMIA: Primary | ICD-10-CM

## 2023-08-01 DIAGNOSIS — I10 ESSENTIAL HYPERTENSION: ICD-10-CM

## 2023-08-01 PROCEDURE — 3075F SYST BP GE 130 - 139MM HG: CPT | Performed by: FAMILY MEDICINE

## 2023-08-01 PROCEDURE — 3051F HG A1C>EQUAL 7.0%<8.0%: CPT | Performed by: FAMILY MEDICINE

## 2023-08-01 PROCEDURE — 3079F DIAST BP 80-89 MM HG: CPT | Performed by: FAMILY MEDICINE

## 2023-08-01 PROCEDURE — 99214 OFFICE O/P EST MOD 30 MIN: CPT | Performed by: FAMILY MEDICINE

## 2023-08-01 PROCEDURE — 90677 PCV20 VACCINE IM: CPT | Performed by: FAMILY MEDICINE

## 2023-08-01 PROCEDURE — 90471 IMMUNIZATION ADMIN: CPT | Performed by: FAMILY MEDICINE

## 2023-08-01 RX ORDER — METOPROLOL SUCCINATE 50 MG/1
50 TABLET, EXTENDED RELEASE ORAL 2 TIMES DAILY
Qty: 60 TABLET | Refills: 6 | Status: SHIPPED | OUTPATIENT
Start: 2023-08-01

## 2023-08-01 RX ORDER — LISINOPRIL 40 MG/1
40 TABLET ORAL DAILY
Qty: 30 TABLET | Refills: 6 | Status: SHIPPED | OUTPATIENT
Start: 2023-08-01

## 2023-08-01 RX ORDER — HYDROCHLOROTHIAZIDE 12.5 MG/1
12.5 TABLET ORAL DAILY
Qty: 30 TABLET | Refills: 6 | Status: SHIPPED | OUTPATIENT
Start: 2023-08-01

## 2023-08-01 RX ORDER — AMLODIPINE BESYLATE 10 MG/1
10 TABLET ORAL DAILY
Qty: 30 TABLET | Refills: 6 | Status: SHIPPED | OUTPATIENT
Start: 2023-08-01

## 2023-08-01 RX ORDER — SIMVASTATIN 20 MG
20 TABLET ORAL DAILY
Qty: 30 TABLET | Refills: 6 | Status: SHIPPED | OUTPATIENT
Start: 2023-08-01

## 2023-08-01 NOTE — PROGRESS NOTES
03/10/2023 09:31 AM    ANIONGAP 13 07/28/2022 08:01 AM    GLUCOSE 102 07/28/2023 08:01 AM    GLUCOSE 117 03/10/2023 09:31 AM    GLUCOSE 176 07/28/2022 08:01 AM    BUN 12 07/28/2023 08:01 AM    BUN 14 03/10/2023 09:31 AM    BUN 14 07/28/2022 08:01 AM    CREATININE 0.7 07/28/2023 08:01 AM    CREATININE 0.6 03/10/2023 09:31 AM    CREATININE 0.7 07/28/2022 08:01 AM    LABGLOM >60 07/28/2023 08:01 AM    LABGLOM >60 03/10/2023 09:31 AM    LABGLOM >60 07/28/2022 08:01 AM    LABGLOM >60 03/30/2022 01:03 PM    LABGLOM 72 07/02/2021 12:00 AM    LABGLOM >60 02/19/2020 08:53 AM    GFRAA >60 07/28/2022 08:01 AM    GFRAA >60 03/30/2022 01:03 PM    GFRAA >60 02/19/2020 08:53 AM    CALCIUM 9.1 07/28/2023 08:01 AM    CALCIUM 9.2 03/10/2023 09:31 AM    CALCIUM 9.3 07/28/2022 08:01 AM    PROT 7.4 07/28/2023 08:01 AM    PROT 7.5 03/10/2023 09:31 AM    PROT 7.8 07/28/2022 08:01 AM    LABALBU 4.3 07/28/2023 08:01 AM    LABALBU 4.1 03/10/2023 09:31 AM    LABALBU 4.5 07/28/2022 08:01 AM    BILITOT 0.3 07/28/2023 08:01 AM    BILITOT 0.4 03/10/2023 09:31 AM    BILITOT 0.4 07/28/2022 08:01 AM    ALKPHOS 72 07/28/2023 08:01 AM    ALKPHOS 70 03/10/2023 09:31 AM    ALKPHOS 68 07/28/2022 08:01 AM    AST 16 07/28/2023 08:01 AM    AST 27 03/10/2023 09:31 AM    AST 17 07/28/2022 08:01 AM    ALT 16 07/28/2023 08:01 AM    ALT 22 03/10/2023 09:31 AM    ALT 18 07/28/2022 08:01 AM     A1C  Lab Results   Component Value Date/Time    LABA1C 7.8 07/28/2023 08:01 AM    LABA1C 6.9 03/10/2023 09:31 AM    LABA1C 8.4 07/28/2022 08:01 AM     TSH  Lab Results   Component Value Date/Time    TSH 2.250 03/10/2023 09:31 AM    TSH 1.770 07/28/2022 08:01 AM    TSH 2.470 03/30/2022 01:03 PM     FREET4  No results found for: [id number]  LIPID  Lab Results   Component Value Date/Time    CHOL 213 07/28/2023 08:01 AM    CHOL 206 03/10/2023 09:31 AM    CHOL 190 07/28/2022 08:01 AM    CHOL 185 03/30/2022 01:03 PM    HDL 66 07/28/2023 08:01 AM    HDL 73 03/10/2023 09:31 AM    HDL

## 2023-08-29 ENCOUNTER — OFFICE VISIT (OUTPATIENT)
Dept: SLEEP MEDICINE | Age: 53
End: 2023-08-29
Payer: COMMERCIAL

## 2023-08-29 VITALS
WEIGHT: 210 LBS | HEIGHT: 67 IN | BODY MASS INDEX: 32.96 KG/M2 | DIASTOLIC BLOOD PRESSURE: 100 MMHG | OXYGEN SATURATION: 99 % | HEART RATE: 83 BPM | RESPIRATION RATE: 14 BRPM | SYSTOLIC BLOOD PRESSURE: 182 MMHG

## 2023-08-29 DIAGNOSIS — G47.33 OSA (OBSTRUCTIVE SLEEP APNEA): Primary | ICD-10-CM

## 2023-08-29 DIAGNOSIS — I10 ESSENTIAL HYPERTENSION: ICD-10-CM

## 2023-08-29 PROCEDURE — 3077F SYST BP >= 140 MM HG: CPT | Performed by: STUDENT IN AN ORGANIZED HEALTH CARE EDUCATION/TRAINING PROGRAM

## 2023-08-29 PROCEDURE — 3080F DIAST BP >= 90 MM HG: CPT | Performed by: STUDENT IN AN ORGANIZED HEALTH CARE EDUCATION/TRAINING PROGRAM

## 2023-08-29 PROCEDURE — 99204 OFFICE O/P NEW MOD 45 MIN: CPT | Performed by: STUDENT IN AN ORGANIZED HEALTH CARE EDUCATION/TRAINING PROGRAM

## 2023-08-29 ASSESSMENT — SLEEP AND FATIGUE QUESTIONNAIRES
HOW LIKELY ARE YOU TO NOD OFF OR FALL ASLEEP WHILE SITTING INACTIVE IN A PUBLIC PLACE: 0
NECK CIRCUMFERENCE (INCHES): 16.5
ESS TOTAL SCORE: 6
HOW LIKELY ARE YOU TO NOD OFF OR FALL ASLEEP WHEN YOU ARE A PASSENGER IN A CAR FOR AN HOUR WITHOUT A BREAK: 2
HOW LIKELY ARE YOU TO NOD OFF OR FALL ASLEEP WHILE LYING DOWN TO REST IN THE AFTERNOON WHEN CIRCUMSTANCES PERMIT: 2
HOW LIKELY ARE YOU TO NOD OFF OR FALL ASLEEP IN A CAR, WHILE STOPPED FOR A FEW MINUTES IN TRAFFIC: 0
HOW LIKELY ARE YOU TO NOD OFF OR FALL ASLEEP WHILE SITTING QUIETLY AFTER LUNCH WITHOUT ALCOHOL: 0
HOW LIKELY ARE YOU TO NOD OFF OR FALL ASLEEP WHILE SITTING AND TALKING TO SOMEONE: 0
HOW LIKELY ARE YOU TO NOD OFF OR FALL ASLEEP WHILE SITTING AND READING: 1
HOW LIKELY ARE YOU TO NOD OFF OR FALL ASLEEP WHILE WATCHING TV: 1

## 2023-08-29 NOTE — PROGRESS NOTES
[address] [place], [place] [address]Th Floor Sleep Medicine    Patient Name: Frandy Hernandez  Age: 46 y.o.   : 1970    Date of Visit: 23    HPI   Frandy Hernandez is a 46 y.o. female with  has no past medical history on file. who presents as a new patient to Sleep Clinic, referred by Dr. Zaid Lake, for Sleep Apnea    SLEEP STUDY HISTORY    Sleep study 15+ years ago, no records. Has been on auto CPAP for 15 years and needs a new device. Cannot sleep without it. Sleep-related breathing history:  - Excessive daytime sleepiness: N  - Loud snoring: y  - Witnessed apnea: y  - Nocturnal choking/gasping: y  - Awakening with dry mouth: y  - Morning headaches: n  - Difficulty concentrating during the day: n  - Mood changes or irritability:     Sleep Hygiene:  Bed Time: 9p  Wake Time: 4a  Time to fall asleep: short < 10 min  Awakenings/Arousals (cause): 1-2x  WASO (wake after sleep onset): <5 min  Naps (if so, how many/how long?): none  Hypnotics/medications that contribute to sleep: none  Caffeine use (if so, how much?): 2 cups coffee daily  Tobacco, alcohol, or illicit drug use: none    Additional Sleep History:  - The patient does not endorse an irresistible urge to move their legs accompanied by uncomfortable sensations in their lower limbs at night. - The patient does not endorse cataplexy or cataplectic episodes (such as sudden muscle what weakness that makes them go limp or unable to move, often elicited by positive emotion)  - The patient does not endorse hypnagogic/hypnopompic hallucinations  - The patient does not endorse sleep paralysis  - The patient does not endorse wake up wondering they are, nocturnal behaviors they cannot recollect, or sleep walking.  - The patient does endorse teeth grinding  - The patient does not report drowsy driving or accidents due to sleepiness    PMH:  No past medical history on file. PSH:  No past surgical history on file.      Soc Hx:  Social History     Tobacco Use    Smoking status: Former

## 2023-09-18 ENCOUNTER — HOSPITAL ENCOUNTER (OUTPATIENT)
Dept: SLEEP CENTER | Age: 53
Discharge: HOME | End: 2023-09-18
Payer: COMMERCIAL

## 2023-09-18 DIAGNOSIS — G47.33 OSA (OBSTRUCTIVE SLEEP APNEA): ICD-10-CM

## 2023-09-18 PROCEDURE — 95800 SLP STDY UNATTENDED: CPT

## 2023-09-26 ENCOUNTER — TELEMEDICINE (OUTPATIENT)
Dept: SLEEP MEDICINE | Age: 53
End: 2023-09-26
Payer: COMMERCIAL

## 2023-09-26 DIAGNOSIS — E66.9 OBESITY (BMI 30-39.9): ICD-10-CM

## 2023-09-26 DIAGNOSIS — G47.33 OSA (OBSTRUCTIVE SLEEP APNEA): Primary | ICD-10-CM

## 2023-09-26 PROCEDURE — 99215 OFFICE O/P EST HI 40 MIN: CPT | Performed by: STUDENT IN AN ORGANIZED HEALTH CARE EDUCATION/TRAINING PROGRAM

## 2023-09-26 NOTE — PROGRESS NOTES
Jeff Hawk, was evaluated through a synchronous (real-time) audio-video encounter. The patient (or guardian if applicable) is aware that this is a billable service, which includes applicable co-pays. This Virtual Visit was conducted with patient's (and/or legal guardian's) consent. Patient identification was verified, and a caregiver was present when appropriate. The patient was located at Home: [place] [address]  Provider was located at [facility] (Appt Dept): [address]      Jeff Hawk (:  1970) is a Established patient, presenting virtually for evaluation of the following:    Assessment & Plan   Below is the assessment and plan developed based on review of pertinent history, physical exam, labs, studies, and medications. 1. CHINO (obstructive sleep apnea)  Discussed sleep study results, emphasized severity of study and diagnosis including possible clinical sequelae of microvascular disease including higher incidence of stroke, atrial fibrillation, hypertension, or other cognitive microvascular damage. Counseled on appropriate use of the device, such as using distilled water, daily cleaning of mask and tube with gentle soap and water or non-toxic wipes. Counseled on troubleshooting device (such as rainout) and being aware of the ambient humidity of the room. 2. Obesity (BMI 30-39. 9)  Rec'd 10-20% weight loss of total body weight (if feasible). Patient instructed on proper nutrition and estimated total daily caloric expenditure for their height and weight. Discussed that CHINO may improve with weight loss, but there is no guarantee of reversal.     Return in about 3 months (around 2023). History:    DENIA Hawk is a 46 y.o. female with  has no past medical history on file. who presents as a new patient to Sleep Clinic, referred by Dr. Maira nova. provider found, for No chief complaint on file. SLEEP STUDY HISTORY  23.

## 2023-10-19 DIAGNOSIS — G47.33 OSA (OBSTRUCTIVE SLEEP APNEA): Primary | ICD-10-CM

## 2024-01-25 ENCOUNTER — TELEPHONE (OUTPATIENT)
Dept: PRIMARY CARE CLINIC | Age: 54
End: 2024-01-25

## 2024-03-18 DIAGNOSIS — E78.2 MIXED HYPERLIPIDEMIA: ICD-10-CM

## 2024-03-18 DIAGNOSIS — E11.65 TYPE 2 DIABETES MELLITUS WITH HYPERGLYCEMIA, WITHOUT LONG-TERM CURRENT USE OF INSULIN (HCC): ICD-10-CM

## 2024-03-18 DIAGNOSIS — I10 ESSENTIAL HYPERTENSION: ICD-10-CM

## 2024-03-18 LAB
ABSOLUTE IMMATURE GRANULOCYTE: <0.03 K/UL (ref 0–0.58)
ALBUMIN SERPL-MCNC: 4.5 G/DL (ref 3.5–5.2)
ALP BLD-CCNC: 87 U/L (ref 35–104)
ALT SERPL-CCNC: 29 U/L (ref 0–32)
ANION GAP SERPL CALCULATED.3IONS-SCNC: 15 MMOL/L (ref 7–16)
AST SERPL-CCNC: 22 U/L (ref 0–31)
BASOPHILS ABSOLUTE: 0.05 K/UL (ref 0–0.2)
BASOPHILS RELATIVE PERCENT: 1 % (ref 0–2)
BILIRUB SERPL-MCNC: 0.4 MG/DL (ref 0–1.2)
BILIRUBIN URINE: NEGATIVE
BUN BLDV-MCNC: 8 MG/DL (ref 6–20)
CALCIUM SERPL-MCNC: 9.3 MG/DL (ref 8.6–10.2)
CHLORIDE BLD-SCNC: 99 MMOL/L (ref 98–107)
CHOLESTEROL: 176 MG/DL
CO2: 25 MMOL/L (ref 22–29)
COLOR: YELLOW
CREAT SERPL-MCNC: 0.6 MG/DL (ref 0.5–1)
EOSINOPHILS ABSOLUTE: 0.1 K/UL (ref 0.05–0.5)
EOSINOPHILS RELATIVE PERCENT: 1 % (ref 0–6)
GFR SERPL CREATININE-BSD FRML MDRD: >60 ML/MIN/1.73M2
GLUCOSE BLD-MCNC: 157 MG/DL (ref 74–99)
GLUCOSE URINE: NEGATIVE MG/DL
HCT VFR BLD CALC: 43.2 % (ref 34–48)
HDLC SERPL-MCNC: 70 MG/DL
HEMOGLOBIN: 13.9 G/DL (ref 11.5–15.5)
IMMATURE GRANULOCYTES: 0 % (ref 0–5)
KETONES, URINE: NEGATIVE MG/DL
LDL CHOLESTEROL: 78 MG/DL
LEUKOCYTE ESTERASE, URINE: ABNORMAL
LYMPHOCYTES ABSOLUTE: 2.04 K/UL (ref 1.5–4)
LYMPHOCYTES RELATIVE PERCENT: 24 % (ref 20–42)
MCH RBC QN AUTO: 28.4 PG (ref 26–35)
MCHC RBC AUTO-ENTMCNC: 32.2 G/DL (ref 32–34.5)
MCV RBC AUTO: 88.2 FL (ref 80–99.9)
MONOCYTES ABSOLUTE: 0.61 K/UL (ref 0.1–0.95)
MONOCYTES RELATIVE PERCENT: 7 % (ref 2–12)
NEUTROPHILS ABSOLUTE: 5.54 K/UL (ref 1.8–7.3)
NEUTROPHILS RELATIVE PERCENT: 66 % (ref 43–80)
NITRITE, URINE: NEGATIVE
PDW BLD-RTO: 13.4 % (ref 11.5–15)
PH UA: 5.5 (ref 5–9)
PLATELET # BLD: 425 K/UL (ref 130–450)
PMV BLD AUTO: 9.6 FL (ref 7–12)
POTASSIUM SERPL-SCNC: 3.9 MMOL/L (ref 3.5–5)
PROTEIN UA: NEGATIVE MG/DL
RBC # BLD: 4.9 M/UL (ref 3.5–5.5)
RBC UA: NORMAL /HPF
SODIUM BLD-SCNC: 139 MMOL/L (ref 132–146)
SPECIFIC GRAVITY UA: 1.02 (ref 1–1.03)
TOTAL CK: 105 U/L (ref 20–180)
TOTAL PROTEIN: 8 G/DL (ref 6.4–8.3)
TRIGL SERPL-MCNC: 141 MG/DL
TSH SERPL DL<=0.05 MIU/L-ACNC: 2.18 UIU/ML (ref 0.27–4.2)
TURBIDITY: CLEAR
URINE HGB: ABNORMAL
UROBILINOGEN, URINE: 0.2 EU/DL (ref 0–1)
VLDLC SERPL CALC-MCNC: 28 MG/DL
WBC # BLD: 8.4 K/UL (ref 4.5–11.5)
WBC UA: NORMAL /HPF

## 2024-03-18 NOTE — RESULT ENCOUNTER NOTE
Glucose elevated at 157.  Emphasize low sugar low-carb diet.  If hemoglobin A1c not drawn will need POCT hemoglobin A1c at office visit.

## 2024-03-19 LAB
CREATININE URINE: 54.6 MG/DL (ref 29–226)
MICROALBUMIN/CREAT 24H UR: <12 MG/L (ref 0–19)
MICROALBUMIN/CREAT UR-RTO: NORMAL MCG/MG CREAT (ref 0–30)

## 2024-03-20 DIAGNOSIS — E78.2 MIXED HYPERLIPIDEMIA: ICD-10-CM

## 2024-03-20 RX ORDER — SIMVASTATIN 20 MG
20 TABLET ORAL DAILY
Qty: 30 TABLET | Refills: 1 | Status: SHIPPED | OUTPATIENT
Start: 2024-03-20

## 2024-05-02 ENCOUNTER — OFFICE VISIT (OUTPATIENT)
Dept: PRIMARY CARE CLINIC | Age: 54
End: 2024-05-02
Payer: COMMERCIAL

## 2024-05-02 VITALS
HEART RATE: 85 BPM | TEMPERATURE: 97.4 F | SYSTOLIC BLOOD PRESSURE: 138 MMHG | BODY MASS INDEX: 32.26 KG/M2 | DIASTOLIC BLOOD PRESSURE: 82 MMHG | WEIGHT: 206 LBS | OXYGEN SATURATION: 100 %

## 2024-05-02 DIAGNOSIS — G47.33 OSA (OBSTRUCTIVE SLEEP APNEA): ICD-10-CM

## 2024-05-02 DIAGNOSIS — E11.65 TYPE 2 DIABETES MELLITUS WITH HYPERGLYCEMIA, WITHOUT LONG-TERM CURRENT USE OF INSULIN (HCC): ICD-10-CM

## 2024-05-02 DIAGNOSIS — J30.89 ALLERGIC RHINITIS DUE TO OTHER ALLERGIC TRIGGER, UNSPECIFIED SEASONALITY: ICD-10-CM

## 2024-05-02 DIAGNOSIS — Z12.31 BREAST CANCER SCREENING BY MAMMOGRAM: ICD-10-CM

## 2024-05-02 DIAGNOSIS — I10 ESSENTIAL HYPERTENSION: ICD-10-CM

## 2024-05-02 DIAGNOSIS — R31.9 HEMATURIA, UNSPECIFIED TYPE: ICD-10-CM

## 2024-05-02 DIAGNOSIS — D68.59 THROMBOPHILIA (HCC): ICD-10-CM

## 2024-05-02 DIAGNOSIS — E78.2 MIXED HYPERLIPIDEMIA: Primary | ICD-10-CM

## 2024-05-02 DIAGNOSIS — Z12.11 COLON CANCER SCREENING: ICD-10-CM

## 2024-05-02 LAB — HBA1C MFR BLD: 8.2 %

## 2024-05-02 PROCEDURE — 3052F HG A1C>EQUAL 8.0%<EQUAL 9.0%: CPT | Performed by: FAMILY MEDICINE

## 2024-05-02 PROCEDURE — 3075F SYST BP GE 130 - 139MM HG: CPT | Performed by: FAMILY MEDICINE

## 2024-05-02 PROCEDURE — 99214 OFFICE O/P EST MOD 30 MIN: CPT | Performed by: FAMILY MEDICINE

## 2024-05-02 PROCEDURE — 83036 HEMOGLOBIN GLYCOSYLATED A1C: CPT | Performed by: FAMILY MEDICINE

## 2024-05-02 PROCEDURE — 3079F DIAST BP 80-89 MM HG: CPT | Performed by: FAMILY MEDICINE

## 2024-05-02 RX ORDER — SIMVASTATIN 20 MG
20 TABLET ORAL DAILY
Qty: 30 TABLET | Refills: 6 | Status: SHIPPED | OUTPATIENT
Start: 2024-05-02

## 2024-05-02 RX ORDER — AMLODIPINE BESYLATE 10 MG/1
10 TABLET ORAL DAILY
Qty: 30 TABLET | Refills: 6 | Status: SHIPPED | OUTPATIENT
Start: 2024-05-02

## 2024-05-02 RX ORDER — METOPROLOL SUCCINATE 50 MG/1
50 TABLET, EXTENDED RELEASE ORAL 2 TIMES DAILY
Qty: 60 TABLET | Refills: 6 | Status: SHIPPED | OUTPATIENT
Start: 2024-05-02

## 2024-05-02 RX ORDER — LISINOPRIL 40 MG/1
40 TABLET ORAL DAILY
Qty: 30 TABLET | Refills: 6 | Status: SHIPPED | OUTPATIENT
Start: 2024-05-02

## 2024-05-02 RX ORDER — HYDROCHLOROTHIAZIDE 12.5 MG/1
12.5 TABLET ORAL DAILY
Qty: 30 TABLET | Refills: 6 | Status: SHIPPED | OUTPATIENT
Start: 2024-05-02

## 2024-05-02 RX ORDER — FLUTICASONE PROPIONATE 50 MCG
2 SPRAY, SUSPENSION (ML) NASAL DAILY
Qty: 1 EACH | Refills: 3 | Status: SHIPPED | OUTPATIENT
Start: 2024-05-02

## 2024-05-02 RX ORDER — SEMAGLUTIDE 2.68 MG/ML
2 INJECTION, SOLUTION SUBCUTANEOUS
Qty: 3 ML | Refills: 3 | Status: SHIPPED | OUTPATIENT
Start: 2024-05-02

## 2024-05-02 SDOH — ECONOMIC STABILITY: FOOD INSECURITY: WITHIN THE PAST 12 MONTHS, YOU WORRIED THAT YOUR FOOD WOULD RUN OUT BEFORE YOU GOT MONEY TO BUY MORE.: NEVER TRUE

## 2024-05-02 SDOH — ECONOMIC STABILITY: FOOD INSECURITY: WITHIN THE PAST 12 MONTHS, THE FOOD YOU BOUGHT JUST DIDN'T LAST AND YOU DIDN'T HAVE MONEY TO GET MORE.: NEVER TRUE

## 2024-05-02 SDOH — ECONOMIC STABILITY: INCOME INSECURITY: HOW HARD IS IT FOR YOU TO PAY FOR THE VERY BASICS LIKE FOOD, HOUSING, MEDICAL CARE, AND HEATING?: NOT HARD AT ALL

## 2024-05-02 ASSESSMENT — PATIENT HEALTH QUESTIONNAIRE - PHQ9
SUM OF ALL RESPONSES TO PHQ QUESTIONS 1-9: 0
SUM OF ALL RESPONSES TO PHQ9 QUESTIONS 1 & 2: 0
SUM OF ALL RESPONSES TO PHQ QUESTIONS 1-9: 0
2. FEELING DOWN, DEPRESSED OR HOPELESS: NOT AT ALL
SUM OF ALL RESPONSES TO PHQ QUESTIONS 1-9: 0
SUM OF ALL RESPONSES TO PHQ QUESTIONS 1-9: 0
1. LITTLE INTEREST OR PLEASURE IN DOING THINGS: NOT AT ALL

## 2024-05-02 NOTE — PROGRESS NOTES
Dixie Sommers : 1970 Sex: female  Age: 54 y.o.    Chief Complaint   Patient presents with    3 Month Follow-Up    Discuss Labs    Allergies     Was seen at an outside [facility] care x2-3 week ago          HPI:        Presents for follow-up.  Generally feeling well, allergies acting up.  On Claritin.  Loves her job.    Health Maintenance Issues, Importance of Mammogram Reviewed and Ordered, She Does Reluctantly Agree to Fit Test but Does Not Commit to Cologuard or Colonoscopy, Vaccine Recommendations Reviewed      Blood work reviewed, glucose 157 with an A1c up to 8.2 CMP normal HDL 70 LDL 78 triglyceride 141 TSH 2.18 CBC with differential normal urinalysis trace hemoglobin 0-2 RBCs, defers urology consult    Most Recent Labs  CBC  Lab Results   Component Value Date/Time    WBC 8.4 2024 08:37 AM    WBC 8.2 03/10/2023 09:31 AM    WBC 6.3 2022 08:01 AM    RBC 4.90 2024 08:37 AM    RBC 4.38 03/10/2023 09:31 AM    RBC 4.17 2022 08:01 AM    HGB 13.9 2024 08:37 AM    HGB 12.8 03/10/2023 09:31 AM    HGB 12.3 2022 08:01 AM    HCT 43.2 2024 08:37 AM    HCT 38.9 03/10/2023 09:31 AM    HCT 37.6 2022 08:01 AM    MCV 88.2 2024 08:37 AM    MCV 88.8 03/10/2023 09:31 AM    MCV 90.2 2022 08:01 AM     2024 08:37 AM     03/10/2023 09:31 AM     2022 08:01 AM      CMP  Lab Results   Component Value Date/Time     2024 08:37 AM     2023 08:01 AM     03/10/2023 09:31 AM    K 3.9 2024 08:37 AM    K 4.4 2023 08:01 AM    K 4.2 03/10/2023 09:31 AM    CL 99 2024 08:37 AM    CL 99 2023 08:01 AM    CL 98 03/10/2023 09:31 AM    CO2 25 2024 08:37 AM    CO2 25 2023 08:01 AM    CO2 21 03/10/2023 09:31 AM    ANIONGAP 15 2024 08:37 AM    ANIONGAP 12 2023 08:01 AM    ANIONGAP 16 03/10/2023 09:31 AM    GLUCOSE 157 2024 08:37 AM    GLUCOSE 102 2023 08:01 AM

## 2024-07-16 LAB — DIABETIC RETINOPATHY: NEGATIVE

## 2024-07-26 ENCOUNTER — HOSPITAL ENCOUNTER (OUTPATIENT)
Dept: MAMMOGRAPHY | Age: 54
Discharge: HOME | End: 2024-07-26
Attending: FAMILY MEDICINE
Payer: COMMERCIAL

## 2024-07-26 VITALS — HEIGHT: 67 IN | WEIGHT: 200 LBS | BODY MASS INDEX: 31.39 KG/M2

## 2024-07-26 DIAGNOSIS — Z12.31 BREAST CANCER SCREENING BY MAMMOGRAM: ICD-10-CM

## 2024-07-26 PROCEDURE — 77067 SCR MAMMO BI INCL CAD: CPT

## 2024-08-27 ENCOUNTER — OFFICE VISIT (OUTPATIENT)
Dept: PRIMARY CARE CLINIC | Age: 54
End: 2024-08-27
Payer: COMMERCIAL

## 2024-08-27 VITALS
HEART RATE: 90 BPM | RESPIRATION RATE: 18 BRPM | TEMPERATURE: 98.1 F | OXYGEN SATURATION: 99 % | HEIGHT: 67 IN | SYSTOLIC BLOOD PRESSURE: 148 MMHG | BODY MASS INDEX: 32.49 KG/M2 | DIASTOLIC BLOOD PRESSURE: 90 MMHG | WEIGHT: 207 LBS

## 2024-08-27 DIAGNOSIS — J30.89 ALLERGIC RHINITIS DUE TO OTHER ALLERGIC TRIGGER, UNSPECIFIED SEASONALITY: ICD-10-CM

## 2024-08-27 DIAGNOSIS — D68.59 THROMBOPHILIA (HCC): ICD-10-CM

## 2024-08-27 DIAGNOSIS — E11.65 TYPE 2 DIABETES MELLITUS WITH HYPERGLYCEMIA, WITHOUT LONG-TERM CURRENT USE OF INSULIN (HCC): Primary | ICD-10-CM

## 2024-08-27 DIAGNOSIS — I10 ESSENTIAL HYPERTENSION: ICD-10-CM

## 2024-08-27 DIAGNOSIS — E78.2 MIXED HYPERLIPIDEMIA: ICD-10-CM

## 2024-08-27 LAB
CHP ED QC CHECK: NORMAL
GLUCOSE BLD-MCNC: 200 MG/DL
HBA1C MFR BLD: 8.6 %

## 2024-08-27 PROCEDURE — 82962 GLUCOSE BLOOD TEST: CPT | Performed by: FAMILY MEDICINE

## 2024-08-27 PROCEDURE — 3052F HG A1C>EQUAL 8.0%<EQUAL 9.0%: CPT | Performed by: FAMILY MEDICINE

## 2024-08-27 PROCEDURE — 99214 OFFICE O/P EST MOD 30 MIN: CPT | Performed by: FAMILY MEDICINE

## 2024-08-27 PROCEDURE — 3077F SYST BP >= 140 MM HG: CPT | Performed by: FAMILY MEDICINE

## 2024-08-27 PROCEDURE — 83036 HEMOGLOBIN GLYCOSYLATED A1C: CPT | Performed by: FAMILY MEDICINE

## 2024-08-27 PROCEDURE — 3080F DIAST BP >= 90 MM HG: CPT | Performed by: FAMILY MEDICINE

## 2024-08-27 RX ORDER — HYDROCHLOROTHIAZIDE 12.5 MG/1
12.5 TABLET ORAL DAILY
Qty: 30 TABLET | Refills: 6 | Status: SHIPPED | OUTPATIENT
Start: 2024-08-27

## 2024-08-27 RX ORDER — FLUTICASONE PROPIONATE 50 MCG
2 SPRAY, SUSPENSION (ML) NASAL DAILY
Qty: 1 EACH | Refills: 3 | Status: SHIPPED | OUTPATIENT
Start: 2024-08-27

## 2024-08-27 RX ORDER — SEMAGLUTIDE 2.68 MG/ML
2 INJECTION, SOLUTION SUBCUTANEOUS
Qty: 3 ML | Refills: 3 | Status: SHIPPED | OUTPATIENT
Start: 2024-08-27

## 2024-08-27 RX ORDER — METOPROLOL SUCCINATE 50 MG/1
50 TABLET, EXTENDED RELEASE ORAL 2 TIMES DAILY
Qty: 60 TABLET | Refills: 6 | Status: SHIPPED | OUTPATIENT
Start: 2024-08-27

## 2024-08-27 RX ORDER — SIMVASTATIN 20 MG
20 TABLET ORAL DAILY
Qty: 30 TABLET | Refills: 6 | Status: SHIPPED | OUTPATIENT
Start: 2024-08-27

## 2024-08-27 RX ORDER — LISINOPRIL 40 MG/1
40 TABLET ORAL DAILY
Qty: 30 TABLET | Refills: 6 | Status: SHIPPED | OUTPATIENT
Start: 2024-08-27

## 2024-08-27 RX ORDER — AMLODIPINE BESYLATE 10 MG/1
10 TABLET ORAL DAILY
Qty: 30 TABLET | Refills: 6 | Status: SHIPPED | OUTPATIENT
Start: 2024-08-27

## 2024-08-27 ASSESSMENT — PATIENT HEALTH QUESTIONNAIRE - PHQ9
SUM OF ALL RESPONSES TO PHQ QUESTIONS 1-9: 0
1. LITTLE INTEREST OR PLEASURE IN DOING THINGS: NOT AT ALL
2. FEELING DOWN, DEPRESSED OR HOPELESS: NOT AT ALL
SUM OF ALL RESPONSES TO PHQ9 QUESTIONS 1 & 2: 0
SUM OF ALL RESPONSES TO PHQ QUESTIONS 1-9: 0

## 2024-08-27 NOTE — PROGRESS NOTES
Dixie Sommers : 1970 Sex: female  Age: 54 y.o.    Chief Complaint   Patient presents with    Diabetes         HPI:      Presents for follow-up.  Admits to not eating healthy, enjoys carbs.  Sodium likely higher than it should be.  Blood pressure not controlled.  Sugar quite uncontrolled.  This is despite multiple medications.  Feels well      Health Maintenance Issues,  She Does Reluctantly Agree to Fit Test but Does Not Commit to Cologuard or Colonoscopy, Vaccine Recommendations Reviewed      Most Recent Labs  CBC  Lab Results   Component Value Date/Time    WBC 8.4 2024 08:37 AM    WBC 8.2 03/10/2023 09:31 AM    WBC 6.3 2022 08:01 AM    RBC 4.90 2024 08:37 AM    RBC 4.38 03/10/2023 09:31 AM    RBC 4.17 2022 08:01 AM    HGB 13.9 2024 08:37 AM    HGB 12.8 03/10/2023 09:31 AM    HGB 12.3 2022 08:01 AM    HCT 43.2 2024 08:37 AM    HCT 38.9 03/10/2023 09:31 AM    HCT 37.6 2022 08:01 AM    MCV 88.2 2024 08:37 AM    MCV 88.8 03/10/2023 09:31 AM    MCV 90.2 2022 08:01 AM     2024 08:37 AM     03/10/2023 09:31 AM     2022 08:01 AM      CMP  Lab Results   Component Value Date/Time     2024 08:37 AM     2023 08:01 AM     03/10/2023 09:31 AM    K 3.9 2024 08:37 AM    K 4.4 2023 08:01 AM    K 4.2 03/10/2023 09:31 AM    CL 99 2024 08:37 AM    CL 99 2023 08:01 AM    CL 98 03/10/2023 09:31 AM    CO2 25 2024 08:37 AM    CO2 25 2023 08:01 AM    CO2 21 03/10/2023 09:31 AM    ANIONGAP 15 2024 08:37 AM    ANIONGAP 12 2023 08:01 AM    ANIONGAP 16 03/10/2023 09:31 AM    GLUCOSE 157 2024 08:37 AM    GLUCOSE 102 2023 08:01 AM    GLUCOSE 117 03/10/2023 09:31 AM    BUN 8 2024 08:37 AM    BUN 12 2023 08:01 AM    BUN 14 03/10/2023 09:31 AM    CREATININE 0.6 2024 08:37 AM    CREATININE 0.7 2023 08:01 AM    CREATININE 0.6

## 2024-09-25 DIAGNOSIS — E78.2 MIXED HYPERLIPIDEMIA: ICD-10-CM

## 2024-09-25 DIAGNOSIS — I10 ESSENTIAL HYPERTENSION: ICD-10-CM

## 2024-09-25 DIAGNOSIS — E11.65 TYPE 2 DIABETES MELLITUS WITH HYPERGLYCEMIA, WITHOUT LONG-TERM CURRENT USE OF INSULIN (HCC): ICD-10-CM

## 2024-09-25 LAB
ALBUMIN: 4.7 G/DL (ref 3.5–5.2)
ALP BLD-CCNC: 74 U/L (ref 35–104)
ALT SERPL-CCNC: 33 U/L (ref 0–32)
ANION GAP SERPL CALCULATED.3IONS-SCNC: 16 MMOL/L (ref 7–16)
AST SERPL-CCNC: 30 U/L (ref 0–31)
BASOPHILS ABSOLUTE: 0.06 K/UL (ref 0–0.2)
BASOPHILS RELATIVE PERCENT: 1 % (ref 0–2)
BILIRUB SERPL-MCNC: 0.3 MG/DL (ref 0–1.2)
BILIRUBIN, URINE: NEGATIVE
BUN BLDV-MCNC: 12 MG/DL (ref 6–20)
CALCIUM SERPL-MCNC: 9.4 MG/DL (ref 8.6–10.2)
CHLORIDE BLD-SCNC: 98 MMOL/L (ref 98–107)
CHOLESTEROL, TOTAL: 138 MG/DL
CO2: 23 MMOL/L (ref 22–29)
COLOR, UA: YELLOW
COMMENT: ABNORMAL
CREAT SERPL-MCNC: 0.7 MG/DL (ref 0.5–1)
CREATININE URINE: 84.2 MG/DL (ref 29–226)
EOSINOPHILS ABSOLUTE: 0.09 K/UL (ref 0.05–0.5)
EOSINOPHILS RELATIVE PERCENT: 1 % (ref 0–6)
GFR, ESTIMATED: >90 ML/MIN/1.73M2
GLUCOSE BLD-MCNC: 142 MG/DL (ref 74–99)
GLUCOSE URINE: >=1000 MG/DL
HCT VFR BLD CALC: 42 % (ref 34–48)
HDLC SERPL-MCNC: 55 MG/DL
HEMOGLOBIN: 13.8 G/DL (ref 11.5–15.5)
IMMATURE GRANULOCYTES %: 0 % (ref 0–5)
IMMATURE GRANULOCYTES ABSOLUTE: <0.03 K/UL (ref 0–0.58)
KETONES, URINE: NEGATIVE MG/DL
LDL CHOLESTEROL: 62 MG/DL
LEUKOCYTE ESTERASE, URINE: NEGATIVE
LYMPHOCYTES ABSOLUTE: 1.55 K/UL (ref 1.5–4)
LYMPHOCYTES RELATIVE PERCENT: 20 % (ref 20–42)
MCH RBC QN AUTO: 28.8 PG (ref 26–35)
MCHC RBC AUTO-ENTMCNC: 32.9 G/DL (ref 32–34.5)
MCV RBC AUTO: 87.7 FL (ref 80–99.9)
MICROALBUMIN/CREAT 24H UR: 27 MG/L (ref 0–19)
MICROALBUMIN/CREAT UR-RTO: 32 MCG/MG CREAT (ref 0–30)
MONOCYTES ABSOLUTE: 0.7 K/UL (ref 0.1–0.95)
MONOCYTES RELATIVE PERCENT: 9 % (ref 2–12)
NEUTROPHILS ABSOLUTE: 5.25 K/UL (ref 1.8–7.3)
NEUTROPHILS RELATIVE PERCENT: 68 % (ref 43–80)
NITRITE, URINE: NEGATIVE
PDW BLD-RTO: 13 % (ref 11.5–15)
PH, URINE: 5.5 (ref 5–9)
PLATELET # BLD: 454 K/UL (ref 130–450)
PMV BLD AUTO: 9.8 FL (ref 7–12)
POTASSIUM SERPL-SCNC: 4.4 MMOL/L (ref 3.5–5)
PROTEIN UA: NEGATIVE MG/DL
RBC # BLD: 4.79 M/UL (ref 3.5–5.5)
SODIUM BLD-SCNC: 137 MMOL/L (ref 132–146)
SPECIFIC GRAVITY UA: 1.01 (ref 1–1.03)
TOTAL CK: 93 U/L (ref 20–180)
TOTAL PROTEIN: 7.7 G/DL (ref 6.4–8.3)
TRIGL SERPL-MCNC: 103 MG/DL
TSH SERPL DL<=0.05 MIU/L-ACNC: 1.58 UIU/ML (ref 0.27–4.2)
TURBIDITY: CLEAR
URINE HGB: NEGATIVE
UROBILINOGEN, URINE: 0.2 EU/DL (ref 0–1)
VLDLC SERPL CALC-MCNC: 21 MG/DL
WBC # BLD: 7.7 K/UL (ref 4.5–11.5)

## 2024-09-30 ENCOUNTER — OFFICE VISIT (OUTPATIENT)
Dept: PRIMARY CARE CLINIC | Age: 54
End: 2024-09-30
Payer: COMMERCIAL

## 2024-09-30 VITALS
BODY MASS INDEX: 31.17 KG/M2 | TEMPERATURE: 98 F | SYSTOLIC BLOOD PRESSURE: 138 MMHG | DIASTOLIC BLOOD PRESSURE: 88 MMHG | WEIGHT: 199 LBS

## 2024-09-30 DIAGNOSIS — E78.2 MIXED HYPERLIPIDEMIA: ICD-10-CM

## 2024-09-30 DIAGNOSIS — J30.89 ALLERGIC RHINITIS DUE TO OTHER ALLERGIC TRIGGER, UNSPECIFIED SEASONALITY: ICD-10-CM

## 2024-09-30 DIAGNOSIS — D68.59 THROMBOPHILIA (HCC): ICD-10-CM

## 2024-09-30 DIAGNOSIS — G47.33 OSA (OBSTRUCTIVE SLEEP APNEA): ICD-10-CM

## 2024-09-30 DIAGNOSIS — I10 ESSENTIAL HYPERTENSION: ICD-10-CM

## 2024-09-30 DIAGNOSIS — E11.65 TYPE 2 DIABETES MELLITUS WITH HYPERGLYCEMIA, WITHOUT LONG-TERM CURRENT USE OF INSULIN (HCC): Primary | ICD-10-CM

## 2024-09-30 PROCEDURE — 99214 OFFICE O/P EST MOD 30 MIN: CPT | Performed by: FAMILY MEDICINE

## 2024-09-30 PROCEDURE — 3075F SYST BP GE 130 - 139MM HG: CPT | Performed by: FAMILY MEDICINE

## 2024-09-30 PROCEDURE — 3079F DIAST BP 80-89 MM HG: CPT | Performed by: FAMILY MEDICINE

## 2024-09-30 PROCEDURE — 3052F HG A1C>EQUAL 8.0%<EQUAL 9.0%: CPT | Performed by: FAMILY MEDICINE

## 2024-09-30 NOTE — PROGRESS NOTES
medical conditions and discussions.  Elements necessary for proper CPT code selection are based only on elements of the visit that are truly unique to this visit.

## 2024-11-25 DIAGNOSIS — E11.65 TYPE 2 DIABETES MELLITUS WITH HYPERGLYCEMIA, WITHOUT LONG-TERM CURRENT USE OF INSULIN (HCC): ICD-10-CM

## 2024-11-25 RX ORDER — SEMAGLUTIDE 2.68 MG/ML
2 INJECTION, SOLUTION SUBCUTANEOUS
Qty: 3 ML | Refills: 3 | Status: SHIPPED | OUTPATIENT
Start: 2024-11-25

## 2025-01-09 DIAGNOSIS — E11.65 TYPE 2 DIABETES MELLITUS WITH HYPERGLYCEMIA, WITHOUT LONG-TERM CURRENT USE OF INSULIN (HCC): ICD-10-CM

## 2025-01-09 NOTE — TELEPHONE ENCOUNTER
Refill request/new pharmacy     Name of Medication(s) Requested:  Requested Prescriptions     Pending Prescriptions Disp Refills    empagliflozin (JARDIANCE) 25 MG tablet 90 tablet 1     Sig: Take 1 tablet by mouth daily       Medication is on current medication list Yes    Dosage and directions were verified? Yes    Quantity verified: 90 day supply     Pharmacy Verified?  Yes    Last Appointment:  9/30/2024    Future appts:  Future Appointments   Date Time Provider Department Center   2/3/2025  8:30 AM José Sanchez MD [facility]        (If no appt send self scheduling link. .REFILLAPPT)  Scheduling request sent?     [] Yes  [x] No    Does patient need updated?  [] Yes  [x] No

## 2025-03-22 DIAGNOSIS — E11.65 TYPE 2 DIABETES MELLITUS WITH HYPERGLYCEMIA, WITHOUT LONG-TERM CURRENT USE OF INSULIN (HCC): ICD-10-CM

## 2025-03-23 DIAGNOSIS — J30.89 ALLERGIC RHINITIS DUE TO OTHER ALLERGIC TRIGGER, UNSPECIFIED SEASONALITY: ICD-10-CM

## 2025-03-24 RX ORDER — FLUTICASONE PROPIONATE 50 MCG
2 SPRAY, SUSPENSION (ML) NASAL DAILY
Qty: 1 EACH | Refills: 3 | Status: SHIPPED | OUTPATIENT
Start: 2025-03-24

## 2025-03-24 RX ORDER — SEMAGLUTIDE 2.68 MG/ML
2 INJECTION, SOLUTION SUBCUTANEOUS
Qty: 3 ML | Refills: 3 | Status: SHIPPED | OUTPATIENT
Start: 2025-03-24

## 2025-04-23 ENCOUNTER — TELEMEDICINE (OUTPATIENT)
Dept: SLEEP CENTER | Age: 55
End: 2025-04-23
Payer: COMMERCIAL

## 2025-04-23 DIAGNOSIS — E66.9 OBESITY (BMI 30-39.9): ICD-10-CM

## 2025-04-23 DIAGNOSIS — G47.33 OSA (OBSTRUCTIVE SLEEP APNEA): Primary | ICD-10-CM

## 2025-04-23 PROCEDURE — 99214 OFFICE O/P EST MOD 30 MIN: CPT | Performed by: STUDENT IN AN ORGANIZED HEALTH CARE EDUCATION/TRAINING PROGRAM

## 2025-04-23 NOTE — PROGRESS NOTES
Dixie Sommers, was evaluated through a synchronous (real-time) audio-video encounter. The patient (or guardian if applicable) is aware that this is a billable service, which includes applicable co-pays. This Virtual Visit was conducted with patient's (and/or legal guardian's) consent. Patient identification was verified, and a caregiver was present when appropriate.   The patient was located at Home: [address] [place], [place] [id number]  Provider was located at Facility (Appt Dept): [address] [place] [address]  [address]  [place] [address]  Confirm you are appropriately licensed, registered, or certified to deliver care in the state where the patient is located as indicated above. If you are not or unsure, please re-schedule the visit: Yes, I confirm.     Dixie Sommers (:  1970) is a Established patient, presenting virtually for evaluation of the following:      Below is the assessment and plan developed based on review of pertinent history, physical exam, labs, studies, and medications.     Assessment & Plan  CHINO (obstructive sleep apnea)   Chronic, at goal (stable), continue current treatment plan    Orders:    DME Order for CPAP as OP    Obesity (BMI 30-39.9)   Rec'd 10-20% weight loss of total body weight (if feasible). Patient instructed on proper nutrition and estimated total daily caloric expenditure for their height and weight. Discussed that CHINO may improve with weight loss, but there is no guarantee of reversal.           Subjective          - Continues to do very well with CPAP  - Needed annual follow-up  - Derives benefit from therapy    Objective   Patient-Reported Vitals  No data recorded     Physical Exam  [INSTRUCTIONS:  \"[x]\" Indicates a positive item  \"[]\" Indicates a negative item  -- DELETE ALL ITEMS NOT EXAMINED]    Constitutional: [x] Appears well-developed and well-nourished [x] No apparent distress      [] Abnormal -     Mental status: [x] Alert and awake  [x] Oriented to

## 2025-04-23 NOTE — ASSESSMENT & PLAN NOTE
Chronic, at goal (stable), continue current treatment plan    Orders:    DME Order for CPAP as OP

## 2025-06-07 NOTE — TELEPHONE ENCOUNTER
----- Message from Kortney Burdick sent at 1/25/2024  2:53 PM EST -----  Subject: Referral Request    Reason for referral request? Pt has appt scheduled on 03/07 , and is   requesting for her labs to be ordered prior to her visit please and thank   you  Provider patient wants to be referred to(if known):     Provider Phone Number(if known):    Additional Information for Provider?   ---------------------------------------------------------------------------  --------------  CALL BACK INFO    [phone]; OK to leave message on voicemail  ---------------------------------------------------------------------------  --------------  
Patient notified lab orders in chart   
Negative

## 2025-06-23 ENCOUNTER — RESULTS FOLLOW-UP (OUTPATIENT)
Dept: PRIMARY CARE CLINIC | Age: 55
End: 2025-06-23

## 2025-06-23 DIAGNOSIS — E78.2 MIXED HYPERLIPIDEMIA: ICD-10-CM

## 2025-06-23 DIAGNOSIS — E11.65 TYPE 2 DIABETES MELLITUS WITH HYPERGLYCEMIA, WITHOUT LONG-TERM CURRENT USE OF INSULIN (HCC): ICD-10-CM

## 2025-06-23 DIAGNOSIS — I10 ESSENTIAL HYPERTENSION: ICD-10-CM

## 2025-06-23 LAB
ALBUMIN: 4.5 G/DL (ref 3.5–5.2)
ALP BLD-CCNC: 63 U/L (ref 35–104)
ALT SERPL-CCNC: 28 U/L (ref 0–35)
ANION GAP SERPL CALCULATED.3IONS-SCNC: 15 MMOL/L (ref 7–16)
AST SERPL-CCNC: 31 U/L (ref 0–35)
BACTERIA: ABNORMAL
BASOPHILS ABSOLUTE: 0.05 K/UL (ref 0–0.2)
BASOPHILS RELATIVE PERCENT: 1 % (ref 0–2)
BILIRUB SERPL-MCNC: 0.4 MG/DL (ref 0–1.2)
BILIRUBIN, URINE: NEGATIVE
BUN BLDV-MCNC: 16 MG/DL (ref 6–20)
CALCIUM SERPL-MCNC: 9.9 MG/DL (ref 8.6–10)
CHLORIDE BLD-SCNC: 99 MMOL/L (ref 98–107)
CHOLESTEROL, TOTAL: 144 MG/DL
CO2: 25 MMOL/L (ref 22–29)
COLOR, UA: YELLOW
CREAT SERPL-MCNC: 0.8 MG/DL (ref 0.5–1)
CREATININE URINE: 18.1 MG/DL (ref 29–226)
EOSINOPHILS ABSOLUTE: 0.13 K/UL (ref 0.05–0.5)
EOSINOPHILS RELATIVE PERCENT: 3 % (ref 0–6)
EPITHELIAL CELLS, UA: ABNORMAL /HPF
GFR, ESTIMATED: >90 ML/MIN/1.73M2
GLUCOSE BLD-MCNC: 147 MG/DL (ref 74–99)
GLUCOSE URINE: >=1000 MG/DL
HBA1C MFR BLD: 7.4 % (ref 4–5.6)
HCT VFR BLD CALC: 42 % (ref 34–48)
HDLC SERPL-MCNC: 79 MG/DL
HEMOGLOBIN: 14.2 G/DL (ref 11.5–15.5)
IMMATURE GRANULOCYTES %: 0 % (ref 0–5)
IMMATURE GRANULOCYTES ABSOLUTE: <0.03 K/UL (ref 0–0.58)
KETONES, URINE: NEGATIVE MG/DL
LDL CHOLESTEROL: 48 MG/DL
LEUKOCYTE ESTERASE, URINE: NEGATIVE
LYMPHOCYTES ABSOLUTE: 1.5 K/UL (ref 1.5–4)
LYMPHOCYTES RELATIVE PERCENT: 29 % (ref 20–42)
MCH RBC QN AUTO: 29.2 PG (ref 26–35)
MCHC RBC AUTO-ENTMCNC: 33.8 G/DL (ref 32–34.5)
MCV RBC AUTO: 86.4 FL (ref 80–99.9)
MICROALBUMIN/CREAT 24H UR: <12 MG/L (ref 0–20)
MICROALBUMIN/CREAT UR-RTO: <66 MCG/MG CREAT (ref 0–30)
MONOCYTES ABSOLUTE: 0.4 K/UL (ref 0.1–0.95)
MONOCYTES RELATIVE PERCENT: 8 % (ref 2–12)
NEUTROPHILS ABSOLUTE: 3.03 K/UL (ref 1.8–7.3)
NEUTROPHILS RELATIVE PERCENT: 59 % (ref 43–80)
NITRITE, URINE: NEGATIVE
PDW BLD-RTO: 14 % (ref 11.5–15)
PH, URINE: 6.5 (ref 5–8)
PLATELET # BLD: 434 K/UL (ref 130–450)
PMV BLD AUTO: 9.5 FL (ref 7–12)
POTASSIUM SERPL-SCNC: 4.4 MMOL/L (ref 3.5–5.1)
PROTEIN UA: NEGATIVE MG/DL
RBC # BLD: 4.86 M/UL (ref 3.5–5.5)
RBC UA: ABNORMAL /HPF
SODIUM BLD-SCNC: 140 MMOL/L (ref 136–145)
SPECIFIC GRAVITY UA: <1.005 (ref 1–1.03)
TOTAL CK: 102 U/L (ref 0–170)
TOTAL PROTEIN: 7.8 G/DL (ref 6.4–8.3)
TRIGL SERPL-MCNC: 85 MG/DL
TSH SERPL DL<=0.05 MIU/L-ACNC: 2.03 UIU/ML (ref 0.27–4.2)
TURBIDITY: CLEAR
URINE HGB: ABNORMAL
UROBILINOGEN, URINE: 0.2 EU/DL (ref 0–1)
VLDLC SERPL CALC-MCNC: 17 MG/DL
WBC # BLD: 5.1 K/UL (ref 4.5–11.5)
WBC UA: ABNORMAL /HPF

## 2025-06-27 ENCOUNTER — OFFICE VISIT (OUTPATIENT)
Dept: PRIMARY CARE CLINIC | Age: 55
End: 2025-06-27

## 2025-06-27 VITALS
HEIGHT: 67 IN | WEIGHT: 185 LBS | OXYGEN SATURATION: 98 % | TEMPERATURE: 97.8 F | SYSTOLIC BLOOD PRESSURE: 132 MMHG | BODY MASS INDEX: 29.03 KG/M2 | DIASTOLIC BLOOD PRESSURE: 80 MMHG | HEART RATE: 94 BPM

## 2025-06-27 DIAGNOSIS — I10 ESSENTIAL HYPERTENSION: Primary | ICD-10-CM

## 2025-06-27 DIAGNOSIS — E11.65 TYPE 2 DIABETES MELLITUS WITH HYPERGLYCEMIA, WITHOUT LONG-TERM CURRENT USE OF INSULIN (HCC): ICD-10-CM

## 2025-06-27 DIAGNOSIS — J30.89 ALLERGIC RHINITIS DUE TO OTHER ALLERGIC TRIGGER, UNSPECIFIED SEASONALITY: ICD-10-CM

## 2025-06-27 DIAGNOSIS — D68.59 THROMBOPHILIA: ICD-10-CM

## 2025-06-27 DIAGNOSIS — Z12.11 COLON CANCER SCREENING: ICD-10-CM

## 2025-06-27 DIAGNOSIS — E78.2 MIXED HYPERLIPIDEMIA: ICD-10-CM

## 2025-06-27 DIAGNOSIS — R31.9 HEMATURIA, UNSPECIFIED TYPE: ICD-10-CM

## 2025-06-27 DIAGNOSIS — G47.33 OSA (OBSTRUCTIVE SLEEP APNEA): ICD-10-CM

## 2025-06-27 RX ORDER — SEMAGLUTIDE 2.68 MG/ML
2 INJECTION, SOLUTION SUBCUTANEOUS
Qty: 3 ML | Refills: 3 | Status: SHIPPED | OUTPATIENT
Start: 2025-06-27

## 2025-06-27 RX ORDER — METOPROLOL SUCCINATE 50 MG/1
50 TABLET, EXTENDED RELEASE ORAL 2 TIMES DAILY
Qty: 60 TABLET | Refills: 6 | Status: SHIPPED | OUTPATIENT
Start: 2025-06-27

## 2025-06-27 RX ORDER — LISINOPRIL 40 MG/1
40 TABLET ORAL DAILY
Qty: 30 TABLET | Refills: 6 | Status: SHIPPED | OUTPATIENT
Start: 2025-06-27

## 2025-06-27 RX ORDER — AMLODIPINE BESYLATE 10 MG/1
10 TABLET ORAL DAILY
Qty: 30 TABLET | Refills: 6 | Status: SHIPPED | OUTPATIENT
Start: 2025-06-27

## 2025-06-27 RX ORDER — HYDROCHLOROTHIAZIDE 12.5 MG/1
12.5 TABLET ORAL DAILY
Qty: 30 TABLET | Refills: 6 | Status: SHIPPED | OUTPATIENT
Start: 2025-06-27

## 2025-06-27 RX ORDER — FLUTICASONE PROPIONATE 50 MCG
2 SPRAY, SUSPENSION (ML) NASAL DAILY
Qty: 1 EACH | Refills: 3 | Status: SHIPPED | OUTPATIENT
Start: 2025-06-27

## 2025-06-27 RX ORDER — SIMVASTATIN 20 MG
20 TABLET ORAL DAILY
Qty: 30 TABLET | Refills: 6 | Status: SHIPPED | OUTPATIENT
Start: 2025-06-27

## 2025-06-27 SDOH — ECONOMIC STABILITY: FOOD INSECURITY: WITHIN THE PAST 12 MONTHS, YOU WORRIED THAT YOUR FOOD WOULD RUN OUT BEFORE YOU GOT MONEY TO BUY MORE.: NEVER TRUE

## 2025-06-27 SDOH — ECONOMIC STABILITY: FOOD INSECURITY: WITHIN THE PAST 12 MONTHS, THE FOOD YOU BOUGHT JUST DIDN'T LAST AND YOU DIDN'T HAVE MONEY TO GET MORE.: NEVER TRUE

## 2025-06-27 ASSESSMENT — PATIENT HEALTH QUESTIONNAIRE - PHQ9
SUM OF ALL RESPONSES TO PHQ QUESTIONS 1-9: 0
SUM OF ALL RESPONSES TO PHQ QUESTIONS 1-9: 0
1. LITTLE INTEREST OR PLEASURE IN DOING THINGS: NOT AT ALL
SUM OF ALL RESPONSES TO PHQ QUESTIONS 1-9: 0
2. FEELING DOWN, DEPRESSED OR HOPELESS: NOT AT ALL
SUM OF ALL RESPONSES TO PHQ QUESTIONS 1-9: 0

## 2025-06-27 NOTE — PROGRESS NOTES
Dixie Sommers : 1970 Sex: female  Age: 55 y.o.    Chief Complaint   Patient presents with    Discuss Labs         HPI:      Presents for follow-up.  Feeling much better.  Successful losing weight.  Numbers much better.  Goals reviewed.  She would like to continue same.  Still menstruates, mom was [id number] she is adopted.  With spotting for blood work.  Defers other ELKINS of trace hemoglobin urine    Glucose 147 HDL 79 LDL 48 triglyceride 85 hemoglobin A1c 7.4 TSH 2.03 CBC with differential normal urinalysis trace hemoglobin 0-2 RBC microalbumin creatinine ratio less than 66      Most Recent Labs  CBC  Lab Results   Component Value Date/Time    WBC 5.1 2025 08:13 AM    WBC 7.7 2024 10:09 AM    WBC 8.4 2024 08:37 AM    RBC 4.86 2025 08:13 AM    RBC 4.79 2024 10:09 AM    RBC 4.90 2024 08:37 AM    HGB 14.2 2025 08:13 AM    HGB 13.8 2024 10:09 AM    HGB 13.9 2024 08:37 AM    HCT 42.0 2025 08:13 AM    HCT 42.0 2024 10:09 AM    HCT 43.2 2024 08:37 AM    MCV 86.4 2025 08:13 AM    MCV 87.7 2024 10:09 AM    MCV 88.2 2024 08:37 AM     2025 08:13 AM     2024 10:09 AM     2024 08:37 AM      CMP  Lab Results   Component Value Date/Time     2025 08:13 AM     2024 10:09 AM     2024 08:37 AM    K 4.4 2025 08:13 AM    K 4.4 2024 10:09 AM    K 3.9 2024 08:37 AM    CL 99 2025 08:13 AM    CL 98 2024 10:09 AM    CL 99 2024 08:37 AM    CO2 25 2025 08:13 AM    CO2 23 2024 10:09 AM    CO2 25 2024 08:37 AM    ANIONGAP 15 2025 08:13 AM    ANIONGAP 16 2024 10:09 AM    ANIONGAP 15 2024 08:37 AM    GLUCOSE 147 2025 08:13 AM    GLUCOSE 142 2024 10:09 AM    GLUCOSE 157 2024 08:37 AM    BUN 16 2025 08:13 AM    BUN 12 2024 10:09 AM    BUN 8 2024 08:37 AM    CREATININE 0.8